# Patient Record
Sex: MALE | Race: WHITE | Employment: OTHER | URBAN - METROPOLITAN AREA
[De-identification: names, ages, dates, MRNs, and addresses within clinical notes are randomized per-mention and may not be internally consistent; named-entity substitution may affect disease eponyms.]

---

## 2017-03-27 ENCOUNTER — OFFICE VISIT (OUTPATIENT)
Dept: INTERNAL MEDICINE CLINIC | Age: 72
End: 2017-03-27

## 2017-03-27 VITALS
BODY MASS INDEX: 28.89 KG/M2 | TEMPERATURE: 98.7 F | WEIGHT: 218 LBS | HEART RATE: 80 BPM | SYSTOLIC BLOOD PRESSURE: 132 MMHG | DIASTOLIC BLOOD PRESSURE: 83 MMHG | RESPIRATION RATE: 12 BRPM | HEIGHT: 73 IN

## 2017-03-27 DIAGNOSIS — Z13.39 SCREENING FOR ALCOHOLISM: ICD-10-CM

## 2017-03-27 DIAGNOSIS — I10 ESSENTIAL HYPERTENSION: ICD-10-CM

## 2017-03-27 DIAGNOSIS — Z00.00 ROUTINE GENERAL MEDICAL EXAMINATION AT A HEALTH CARE FACILITY: ICD-10-CM

## 2017-03-27 DIAGNOSIS — F41.9 ANXIETY: ICD-10-CM

## 2017-03-27 DIAGNOSIS — D12.6 TUBULOVILLOUS ADENOMA OF COLON: ICD-10-CM

## 2017-03-27 DIAGNOSIS — Z00.00 MEDICARE ANNUAL WELLNESS VISIT, SUBSEQUENT: Primary | ICD-10-CM

## 2017-03-27 DIAGNOSIS — M62.830 BACK MUSCLE SPASM: ICD-10-CM

## 2017-03-27 DIAGNOSIS — E78.5 HYPERLIPIDEMIA WITH TARGET LDL LESS THAN 130: ICD-10-CM

## 2017-03-27 DIAGNOSIS — Z13.31 SCREENING FOR DEPRESSION: ICD-10-CM

## 2017-03-27 DIAGNOSIS — Z71.89 ADVANCED CARE PLANNING/COUNSELING DISCUSSION: ICD-10-CM

## 2017-03-27 RX ORDER — ALPRAZOLAM 0.25 MG/1
0.25 TABLET ORAL
Qty: 30 TAB | Refills: 0 | Status: SHIPPED | OUTPATIENT
Start: 2017-03-27 | End: 2018-04-18 | Stop reason: SDUPTHER

## 2017-03-27 RX ORDER — CYCLOBENZAPRINE HCL 10 MG
10 TABLET ORAL
Qty: 30 TAB | Refills: 1 | Status: SHIPPED | OUTPATIENT
Start: 2017-03-27 | End: 2019-06-05 | Stop reason: SDUPTHER

## 2017-03-27 RX ORDER — SIMVASTATIN 20 MG/1
TABLET, FILM COATED ORAL
Qty: 90 TAB | Refills: 1 | Status: SHIPPED | OUTPATIENT
Start: 2017-03-27 | End: 2018-07-06 | Stop reason: SDUPTHER

## 2017-03-27 RX ORDER — LISINOPRIL 20 MG/1
TABLET ORAL
Qty: 90 TAB | Refills: 1 | Status: SHIPPED | OUTPATIENT
Start: 2017-03-27 | End: 2018-07-06 | Stop reason: SDUPTHER

## 2017-03-27 NOTE — PROGRESS NOTES
HISTORY OF PRESENT ILLNESS    Chief Complaint   Patient presents with    Annual Wellness Visit       Presents for follow-up    Hypertension  Hypertension ROS: taking medications as instructed, no medication side effects noted, no TIA's, no chest pain on exertion, no dyspnea on exertion, no swelling of ankles     reports that he quit smoking about 42 years ago. His smoking use included Cigarettes. He has a 1.00 pack-year smoking history. He has never used smokeless tobacco.    reports that he drinks about 2.5 oz of alcohol per week    BP Readings from Last 2 Encounters:   03/27/17 132/83   03/24/16 120/76       Hyperlipidemia  ROS: taking medications as instructed, no medication side effects noted  No new myalgias, no joint pains, no weakness  No TIA's, no chest pain on exertion, no dyspnea on exertion, no swelling of ankles. 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 has a past surgical history that includes colonoscopy (7/22/09); cataract removal; tonsillectomy; and colonoscopy (10/31/2012). reports that he quit smoking about 42 years ago. His smoking use included Cigarettes. He has a 1.00 pack-year smoking history. He has never used smokeless tobacco. He reports that he drinks about 2.5 oz of alcohol per week  He reports that he does not use illicit drugs.   family history includes Cancer in an other family member; Emphysema in his father; Stroke in his mother. Physical Exam   Nursing note and vitals reviewed. Blood pressure 132/83, pulse 80, temperature 98.7 °F (37.1 °C), temperature source Oral, resp. rate 12, height 6' 1\" (1.854 m), weight 218 lb (98.9 kg). Constitutional:  No distress. Eyes: Conjunctivae are normal.   Ears:  Hearing grossly intact  Cardiovascular: Normal rate. regular rhythm, no murmurs or gallops  No edema  Pulmonary/Chest: Effort normal.   CTAB  Musculoskeletal: moves all 4 extremities   Neurological: Alert and oriented to person, place, and time. ZACHARIAH - prostate mildly enlarged, no nodules. Skin: No rash noted. Psychiatric: Normal mood and affect. Behavior is normal.     ASSESSMENT and PLAN  Ying Kang was seen today for annual wellness visit. Diagnoses and all orders for this visit:  L    Tubulovillous adenoma of colon- due 10/2017  -     REFERRAL FOR COLONOSCOPY    Hyperlipidemia with target LDL less than 130- Controlled on current regimen. Continue current medications as written in chart  -     LIPID PANEL    Essential hypertension- Controlled on current regimen. Continue current medications as written in chart.  -     CBC W/O DIFF  -     METABOLIC PANEL, COMPREHENSIVE    lab results and schedule of future lab studies reviewed with patient  reviewed medications and side effects in detail    Return to clinic for further evaluation if new symptoms develop    Follow-up Disposition: Not on File    Current Outpatient Prescriptions   Medication Sig    lisinopril (PRINIVIL, ZESTRIL) 20 mg tablet TAKE 1 TABLET BY MOUTH EVERY DAY    simvastatin (ZOCOR) 20 mg tablet TAKE 1 TABLET BY MOUTH EVERY EVENING    cyclobenzaprine (FLEXERIL) 10 mg tablet Take 1 Tab by mouth three (3) times daily as needed for Muscle Spasm(s).  ALPRAZolam (XANAX) 0.25 mg tablet Take 1 Tab by mouth two (2) times daily as needed for Anxiety.     loteprednol etabonate (LOTEMAX) 0.5 % ophthalmic suspension Administer 1 Drop to left eye. Once daily    dorzolamide (TRUSOPT) 2 % ophthalmic solution Administer 1 Drop to right eye. Twice daily     No current facility-administered medications for this visit. Physician Addendum  I personally saw and examined the patient. I have discussed and reviewed note with Nurse Navigator and agree with the Nurse Navigator's findings and recommendations for this Wellness Exam.  I was present during the key portions of separately billed procedures. Orders written and signed by me as per chart.     Prakash Hidalgo MD

## 2017-03-27 NOTE — PROGRESS NOTES
Nurse Navigator Medicare Wellness Visit performed by BRENNAN Zimmerman RN    This is a Subsequent Adriana Visit providing Personalized Prevention Plan Services (PPPS) (Performed 12 months after initial AWV and PPPS )    I have reviewed the patient's medical history in detail and updated the computerized patient record. History     Past Medical History:   Diagnosis Date    Anxiety 1/9/2014    Back muscle spasm 1/9/2014    Congenital cataract     surgery as child    Glaucoma (increased eye pressure)     Dr. Aura Gaitan and Srinivasan Holland HTN (hypertension)     Hyperlipidemia LDL goal < 130 1/6/2012    Tubulovillous adenoma of colon 7/2009    TV adenoma, repeat 10/2012 done, 10/2017      Past Surgical History:   Procedure Laterality Date    COLONOSCOPY  7/22/09    1.1 cm tubulovillous adenoma, tublar adenoma. Dr. Rico Dumont      as child    HX COLONOSCOPY  10/31/2012    polyp - tubular adenoma    HX TONSILLECTOMY       Current Outpatient Prescriptions   Medication Sig Dispense Refill    lisinopril (PRINIVIL, ZESTRIL) 20 mg tablet TAKE 1 TABLET BY MOUTH EVERY DAY 90 Tab 1    simvastatin (ZOCOR) 20 mg tablet TAKE 1 TABLET BY MOUTH EVERY EVENING 90 Tab 1    cyclobenzaprine (FLEXERIL) 10 mg tablet Take 1 Tab by mouth three (3) times daily as needed for Muscle Spasm(s). 30 Tab 1    ALPRAZolam (XANAX) 0.25 mg tablet Take 1 Tab by mouth two (2) times daily as needed for Anxiety. 30 Tab 0    loteprednol etabonate (LOTEMAX) 0.5 % ophthalmic suspension Administer 1 Drop to left eye. Once daily      dorzolamide (TRUSOPT) 2 % ophthalmic solution Administer 1 Drop to right eye.  Twice daily       Allergies   Allergen Reactions    Mercury (Bulk) Rash    Pcn [Penicillins] Rash     As a child; can take Cephalosporins without reaction     Family History   Problem Relation Age of Onset    Cancer Other      aunt    Stroke Mother      carotid dz    Emphysema Father      Social History Substance Use Topics    Smoking status: Former Smoker     Packs/day: 0.50     Years: 2.00     Types: Cigarettes     Quit date: 1/1/1975    Smokeless tobacco: Never Used    Alcohol use 2.5 oz/week     5 Standard drinks or equivalent per week     Patient Active Problem List   Diagnosis Code    Glaucoma (increased eye pressure) H40.9    HTN (hypertension) I10    Tubulovillous adenoma of colon D12.6    Hyperlipidemia with target LDL less than 130 E78.5    Back muscle spasm M62.830    Anxiety F41.9    Advanced care planning/counseling discussion Z71.89       Depression Risk Factor Screening:   Patient denies feelings of being down, depressed or hopeless at this time. Patient states that they have a strong support system within their family & friends. PHQ 2 / 9, over the last two weeks 3/27/2017   Little interest or pleasure in doing things Not at all   Feeling down, depressed or hopeless Not at all   Total Score PHQ 2 0     Alcohol Risk Factor Screening: On any occasion during the past 3 months, have you had more than 4 drinks containing alcohol? No    Do you average more than 14 drinks per week? No      Functional Ability and Level of Safety:     Hearing Loss   normal-to-mild    Activities of Daily Living   Self-care. Patient states that he lives with his wife in a private residence. Patient states independence in all ADLs & denies the use of assistive devices for ambulation. NN encouraged patient to continue and/ or introduce routine physical exercise into their daily routine as applicable & as recommended by PCP. Patient verbalized understanding & agreement to take this into consideration.     Requires assistance with:   ADL Assessment 3/27/2017   Feeding yourself No Help Needed   Getting from bed to chair No Help Needed   Getting dressed No Help Needed   Bathing or showering No Help Needed   Walk across the room (includes cane/walker) No Help Needed   Using the telphone No Help Needed   Taking your medications No Help Needed   Preparing meals No Help Needed   Managing money (expenses/bills) No Help Needed   Moderately strenuous housework (laundry) No Help Needed   Shopping for personal items (toiletries/medicines) No Help Needed   Shopping for groceries No Help Needed   Driving No Help Needed   Climbing a flight of stairs No Help Needed   Getting to places beyond walking distances No Help Needed       Fall Risk   Patient denies falls within the past year & verbalizes awareness of fall prevention strategies. Fall Risk Assessment, last 12 mths 3/27/2017   Able to walk? Yes   Fall in past 12 months? No     Abuse Screen   Patient is not abused    Review of Systems   Medicare Wellness Visit    Physical Examination     Evaluation of Cognitive Function:  Mood/affect:  happy  Appearance: age appropriate and casually dressed  Family member/caregiver input: None present; however, patient reports a strong support system. No exam performed today, Medicare Wellness Visit. Patient Care Team:  Anjum Cruz MD as PCP - General (Internal Medicine)    Advice/Referrals/Counseling   Education and counseling provided:  End-of-Life planning (with patient's consent)  Pneumococcal Vaccine  Influenza Vaccine  Prostate cancer screening tests (PSA, covered annually)  Colorectal cancer screening tests  Screening for glaucoma  tdap & shingles vaccinations      Assessment/Plan   1. Patient states that a completed Advanced Medical Directive is at home. NN encouraged patient to bring a copy of the completed Advanced Medical Directive to the office for scanning into the medical record. Patient verbalized understanding & agreement. 2. Patient is up to date on the following immunizations: flu vaccine (admin 12/2016), tdap vaccine (admin 1/2010), shingles vaccine (admin 3/2015), pneumonia 23 vaccine (admin 12/2011) & prevnar 13 vaccine (admin 2/9/2017). Patient confirmed the aforementioned preventative immunization dates are correct. Patient's health maintenance immunization record has been updated & is current. 3. Patient states that he follows his PCP's recommendations for PSA screenings (report on file from 3/2015) & Colonoscopy screenings (report on file from 10/31/2012 performed by Dr. Kayla Guzman with recommended screening follow-up in 5 years). Patient verbalized awareness that he is due for a screening colonoscopy this winter, 2017. Today, PCP provided patient with a referral to Dr. Waqar Mazariegos for evaluation of a follow-up screening colonoscopy due 10/2015. Hep C screening completed by patient 3/30/2016. 4.Patient wears corrective lenses. Patient reports having a routine eye exam & glaucoma screening within the last year performed by Dr. Janki Mckinney. [facility] faxed requesting a copy of patient's last eye exam with glaucoma screening with patient's verbal approval.     Patient verbalized understanding of all information discussed. Patient was given the opportunity to ask questions. Medication reconciliation completed by MA/ LPN and reviewed by PCP. Patient provided AVS which includes Medicare Wellness Preventative Screening Table. Physician Addendum  I personally saw and examined the patient. I have discussed and reviewed note with Nurse Navigator and agree with the Nurse Navigator's findings and recommendations for this Wellness Exam.  I was present during the key portions of separately billed procedures. Orders written and signed by me as per chart.     Powell Libman, MD

## 2017-03-27 NOTE — MR AVS SNAPSHOT
Visit Information Date & Time Provider Department Dept. Phone Encounter #  
 3/27/2017 10:00 AM Bart Ngo MD [facility] [address] [id number] Upcoming Health Maintenance Date Due  
 GLAUCOMA SCREENING Q2Y 3/19/2017 DTaP/Tdap/Td series (1 - Tdap) 1/1/2020* COLONOSCOPY 10/31/2017 Pneumococcal 65+ Low/Medium Risk (2 of 2 - PPSV23) 2/9/2018 MEDICARE YEARLY EXAM 3/28/2018 *Topic was postponed. The date shown is not the original due date. Allergies as of 3/27/2017  Review Complete On: 3/27/2017 By: Bart Ngo MD  
  
 Severity Noted Reaction Type Reactions Mercury (Bulk)  11/04/2011    Rash Pcn [Penicillins]  11/04/2011    Rash As a child; can take Cephalosporins without reaction Current Immunizations  Reviewed on 3/24/2016 Name Date Hepatitis B Vaccine 1/1/1986 Influenza High Dose Vaccine PF 12/16/2016 Influenza Vaccine 10/1/2015, 11/1/2013 Influenza Vaccine Whole 10/4/2011 Pneumococcal Conjugate (PCV-13) 2/9/2017 Pneumococcal Vaccine (Unspecified Type) 12/16/2011 TD Vaccine 1/1/2010 Zoster Vaccine, Live 3/19/2015 Not reviewed this visit You Were Diagnosed With   
  
 Codes Comments Medicare annual wellness visit, subsequent    -  Primary ICD-10-CM: Z00.00 ICD-9-CM: V70.0 Tubulovillous adenoma of colon     ICD-10-CM: D12.6 ICD-9-CM: 211.3 Hyperlipidemia with target LDL less than 130     ICD-10-CM: E78.5 ICD-9-CM: 272.4 Essential hypertension     ICD-10-CM: I10 
ICD-9-CM: 401.9 Vitals BP Pulse Temp Resp Height(growth percentile) Weight(growth percentile) 132/83 (BP 1 Location: Left arm, BP Patient Position: Sitting) 80 98.7 °F (37.1 °C) (Oral) 12 6' 1\" (1.854 m) 218 lb (98.9 kg) BMI Smoking Status 28.76 kg/m2 Former Smoker Vitals History BMI and BSA Data Body Mass Index Body Surface Area  28.76 kg/m 2 2.26 m 2  
  
  
 Preferred Pharmacy Pharmacy Name Phone [facility] [phone] Your Updated Medication List  
  
   
This list is accurate as of: 3/27/17 10:53 AM.  Always use your most recent med list.  
  
  
  
  
 ALPRAZolam 0.25 mg tablet Commonly known as:  Alicia Dariana Take 1 Tab by mouth two (2) times daily as needed for Anxiety. cyclobenzaprine 10 mg tablet Commonly known as:  FLEXERIL Take 1 Tab by mouth three (3) times daily as needed for Muscle Spasm(s). lisinopril 20 mg tablet Commonly known as:  PRINIVIL, ZESTRIL  
TAKE 1 TABLET BY MOUTH EVERY DAY  
  
 loteprednol etabonate 0.5 % ophthalmic suspension Commonly known as:  Antoniette Locket Administer 1 Drop to left eye. Once daily  
  
 simvastatin 20 mg tablet Commonly known as:  ZOCOR  
TAKE 1 TABLET BY MOUTH EVERY EVENING  
  
 TRUSOPT 2 % ophthalmic solution Generic drug:  dorzolamide Administer 1 Drop to right eye. Twice daily We Performed the Following CBC W/O DIFF [91739 CPT(R)] LIPID PANEL [13002 CPT(R)] METABOLIC PANEL, COMPREHENSIVE [27877 CPT(R)] REFERRAL FOR COLONOSCOPY [[id number] Custom] Comments:  
 Please evaluate patient for follow-up colonscopy. Referral Information Referral ID Referred By Referred To  
  
 [id number] [place] [address]  [place], [address] [place] [address] Visits Status Start Date End Date 1 New Request 3/27/17 3/27/18 If your referral has a status of pending review or denied, additional information will be sent to support the outcome of this decision. Introducing [facility]! Dear Jennifer Handler: 
Thank you for requesting a [employer] account. Our records indicate that you already have an active [employer] account. You can access your account anytime at https://[email or web address] CUPS and some sugar. "[employer]/5 CUPS and some sugar Did you know that you can access your hospital and ER discharge instructions at any time in [employer]? You can also review all of your test results from your hospital stay or ER visit. Additional Information If you have questions, please visit the Frequently Asked Questions section of the [employer] website at [email or web address] Remember, [employer] is NOT to be used for urgent needs. For medical emergencies, dial 911. Now available from your iPhone and Android! Please provide this summary of care documentation to your next provider. Your primary care clinician is listed as Primitivo Burdick. If you have any questions after today's visit, please call [phone].

## 2017-04-13 ENCOUNTER — HOSPITAL ENCOUNTER (OUTPATIENT)
Dept: LAB | Age: 72
Discharge: HOME | End: 2017-04-13
Payer: MEDICARE

## 2017-04-13 PROCEDURE — 80061 LIPID PANEL: CPT

## 2017-04-13 PROCEDURE — 85027 COMPLETE CBC AUTOMATED: CPT

## 2017-04-13 PROCEDURE — 36415 COLL VENOUS BLD VENIPUNCTURE: CPT

## 2017-04-13 PROCEDURE — 80053 COMPREHEN METABOLIC PANEL: CPT

## 2017-04-14 LAB
ALBUMIN SERPL-MCNC: 4.3 G/DL (ref 3.5–4.8)
ALBUMIN/GLOB SERPL: 1.8 {RATIO} (ref 1.2–2.2)
ALP SERPL-CCNC: 33 IU/L (ref 39–117)
ALT SERPL-CCNC: 16 IU/L (ref 0–44)
AST SERPL-CCNC: 19 IU/L (ref 0–40)
BILIRUB SERPL-MCNC: 0.4 MG/DL (ref 0–1.2)
BUN SERPL-MCNC: 15 MG/DL (ref 8–27)
BUN/CREAT SERPL: 14 (ref 10–24)
CALCIUM SERPL-MCNC: 9.8 MG/DL (ref 8.6–10.2)
CHLORIDE SERPL-SCNC: 98 MMOL/L (ref 96–106)
CHOLEST SERPL-MCNC: 214 MG/DL (ref 100–199)
CO2 SERPL-SCNC: 29 MMOL/L (ref 18–29)
CREAT SERPL-MCNC: 1.1 MG/DL (ref 0.76–1.27)
ERYTHROCYTE [DISTWIDTH] IN BLOOD BY AUTOMATED COUNT: 14.2 % (ref 12.3–15.4)
GLOBULIN SER CALC-MCNC: 2.4 G/DL (ref 1.5–4.5)
GLUCOSE SERPL-MCNC: 88 MG/DL (ref 65–99)
HCT VFR BLD AUTO: 41.5 % (ref 37.5–51)
HDLC SERPL-MCNC: 50 MG/DL
HGB BLD-MCNC: 13.7 G/DL (ref 12.6–17.7)
INTERPRETATION, 910389: NORMAL
LDLC SERPL CALC-MCNC: 139 MG/DL (ref 0–99)
MCH RBC QN AUTO: 31.5 PG (ref 26.6–33)
MCHC RBC AUTO-ENTMCNC: 33 G/DL (ref 31.5–35.7)
MCV RBC AUTO: 95 FL (ref 79–97)
PLATELET # BLD AUTO: 252 X10E3/UL (ref 150–379)
POTASSIUM SERPL-SCNC: 4.7 MMOL/L (ref 3.5–5.2)
PROT SERPL-MCNC: 6.7 G/DL (ref 6–8.5)
RBC # BLD AUTO: 4.35 X10E6/UL (ref 4.14–5.8)
SODIUM SERPL-SCNC: 140 MMOL/L (ref 134–144)
TRIGL SERPL-MCNC: 124 MG/DL (ref 0–149)
VLDLC SERPL CALC-MCNC: 25 MG/DL (ref 5–40)
WBC # BLD AUTO: 6.2 X10E3/UL (ref 3.4–10.8)

## 2018-04-18 ENCOUNTER — OFFICE VISIT (OUTPATIENT)
Dept: INTERNAL MEDICINE CLINIC | Age: 73
End: 2018-04-18

## 2018-04-18 VITALS
TEMPERATURE: 98.4 F | DIASTOLIC BLOOD PRESSURE: 80 MMHG | BODY MASS INDEX: 28.41 KG/M2 | OXYGEN SATURATION: 98 % | SYSTOLIC BLOOD PRESSURE: 122 MMHG | WEIGHT: 214.4 LBS | HEART RATE: 69 BPM | HEIGHT: 73 IN | RESPIRATION RATE: 16 BRPM

## 2018-04-18 DIAGNOSIS — Z00.00 MEDICARE ANNUAL WELLNESS VISIT, SUBSEQUENT: Primary | ICD-10-CM

## 2018-04-18 DIAGNOSIS — Z71.89 ADVANCED CARE PLANNING/COUNSELING DISCUSSION: ICD-10-CM

## 2018-04-18 DIAGNOSIS — F41.9 ANXIETY: ICD-10-CM

## 2018-04-18 DIAGNOSIS — D12.6 TUBULOVILLOUS ADENOMA OF COLON: ICD-10-CM

## 2018-04-18 DIAGNOSIS — I10 ESSENTIAL HYPERTENSION: ICD-10-CM

## 2018-04-18 DIAGNOSIS — E78.5 HYPERLIPIDEMIA WITH TARGET LDL LESS THAN 130: ICD-10-CM

## 2018-04-18 RX ORDER — ALPRAZOLAM 0.25 MG/1
0.25 TABLET ORAL
Qty: 30 TAB | Refills: 0 | Status: SHIPPED | OUTPATIENT
Start: 2018-04-18 | End: 2019-06-05 | Stop reason: ALTCHOICE

## 2018-04-18 RX ORDER — ZOSTER VACCINE RECOMBINANT, ADJUVANTED 50 MCG/0.5
0.5 KIT INTRAMUSCULAR ONCE
Qty: 0.5 ML | Refills: 0 | Status: SHIPPED | OUTPATIENT
Start: 2018-04-18 | End: 2018-04-18

## 2018-04-18 NOTE — PROGRESS NOTES
Lorena Tripp is a 71 y.o. male    Chief Complaint   Patient presents with   [facility] Wellness Visit     medicare        1. Have you been to the ER, urgent care clinic since your last visit? Hospitalized since your last visit? No    2. Have you seen or consulted any other health care providers outside of the [address] [place] [address] since your last visit? Include any pap smears or colon screening.   No    Visit Vitals    /88 (BP 1 Location: Left arm, BP Patient Position: Sitting)    Pulse 69    Temp 98.4 °F (36.9 °C) (Oral)    Resp 16    Ht 6' 1\" (1.854 m)    Wt 214 lb 6.4 oz (97.3 kg)    SpO2 98%    BMI 28.29 kg/m2

## 2018-04-18 NOTE — ACP (ADVANCE CARE PLANNING)
Advance Care Planning (ACP) Provider Note - Comprehensive     Date of ACP Conversation: 04/18/18  Persons included in Conversation:  patient  Length of ACP Conversation in minutes:  <16 minutes (Non-Billable)    Authorized Decision Maker (if patient is incapable of making informed decisions): This person is:  Healthcare Agent/Medical Power of  under Advance Directive          General ACP for ALL Patients with Decision Making Capacity:   Importance of advance care planning, including choosing a healthcare agent to communicate patient's healthcare decisions if patient lost the ability to make decisions, such as after a sudden illness or accident  Understanding of the healthcare agent role was assessed and information provided  Exploration of values, goals, and preferences if recovery is not expected, even with continued medical treatment in the event of: Imminent death  Severe, permanent brain injury    Review of Existing Advance Directive:  not availble     For Serious or Chronic Illness:  Understanding of medical condition    Understanding of CPR, goals and expected outcomes, benefits and burdens discussed. Information on CPR success rates provided (e.g. for CPR in hospital, survival to d/c at two weeks is 22%, for chronically ill or elderly/frail survival is less than 3%); Individual asked to communicate understanding of information in his/her own words.   Explored fears and concerns regarding CPR or possible outcomes    Interventions Provided:  Recommended completion of Advance Directive form after review of ACP materials and conversation with prospective healthcare agent   Recommended communicating the plan and making copies for the healthcare agent, personal physician, and others as appropriate (e.g., health system)

## 2018-04-18 NOTE — MR AVS SNAPSHOT
Laura Goodson 
 
 
 [address] [facility] [address] [facility] 
[phone] Patient: Brock Pichardo MRN: [id number] :1945 Visit Information Date & Time Provider Department Dept. Phone Encounter #  
 2018  2:45 PM Sienna Hester MD [facility] [address] [id number] Upcoming Health Maintenance Date Due  
 GLAUCOMA SCREENING Q2Y 3/19/2017 COLONOSCOPY 2018* DTaP/Tdap/Td series (1 - Tdap) 2020* Pneumococcal 65+ Low/Medium Risk (2 of 2 - PPSV23) 2019 MEDICARE YEARLY EXAM 2019 *Topic was postponed. The date shown is not the original due date. Allergies as of 2018  Review Complete On: 2018 By: Ethelle Scale Severity Noted Reaction Type Reactions Mercury (Bulk)  2011    Rash Pcn [Penicillins]  2011    Rash As a child; can take Cephalosporins without reaction Current Immunizations  Reviewed on 2018 Name Date Hepatitis B Vaccine 1986 Influenza High Dose Vaccine PF 2016 Influenza Vaccine 2017, 10/1/2015, 2013 Influenza Vaccine Whole 10/4/2011 Pneumococcal Conjugate (PCV-13) 2018 Pneumococcal Polysaccharide (PPSV-23) 2011 TD Vaccine 2010 ZZZ-RETIRED (DO NOT USE) Pneumococcal Vaccine (Unspecified Type) 2011 Zoster Vaccine, Live 3/19/2015 Reviewed by Sienna Hester MD on 2018 at  3:13 PM  
You Were Diagnosed With   
  
 Codes Comments Medicare annual wellness visit, subsequent    -  Primary ICD-10-CM: Z00.00 ICD-9-CM: V70.0 Advanced care planning/counseling discussion     ICD-10-CM: Z71.89 ICD-9-CM: V65.49 Tubulovillous adenoma of colon     ICD-10-CM: D12.6 ICD-9-CM: 211.3 Hyperlipidemia with target LDL less than 130     ICD-10-CM: E78.5 ICD-9-CM: 272.4 Essential hypertension     ICD-10-CM: I10 
ICD-9-CM: 401.9 Vitals BP Pulse Temp Resp Height(growth percentile) Weight(growth percentile) 122/80 (BP 1 Location: Left arm, BP Patient Position: Sitting) 69 98.4 °F (36.9 °C) (Oral) 16 6' 1\" (1.854 m) 214 lb 6.4 oz (97.3 kg) SpO2 BMI Smoking Status 98% 28.29 kg/m2 Former Smoker Vitals History BMI and BSA Data Body Mass Index Body Surface Area  
 28.29 kg/m 2 2.24 m 2 Preferred Pharmacy Pharmacy Name Phone [address], [address] [place] [phone] Your Updated Medication List  
  
   
This list is accurate as of 18  3:27 PM.  Always use your most recent med list.  
  
  
  
  
 ALPRAZolam 0.25 mg tablet Commonly known as:  Bhakti Stai Take 1 Tab by mouth two (2) times daily as needed for Anxiety. cyclobenzaprine 10 mg tablet Commonly known as:  FLEXERIL Take 1 Tab by mouth three (3) times daily as needed for Muscle Spasm(s). lisinopril 20 mg tablet Commonly known as:  PRINIVIL, ZESTRIL  
TAKE 1 TABLET BY MOUTH EVERY DAY  
  
 loteprednol etabonate 0.5 % ophthalmic suspension Commonly known as:  Joyce Sickle Administer 1 Drop to left eye. Once daily SHINGRIX (PF) 50 mcg/0.5 mL Susr injection Generic drug:  varicella-zoster recombinant (PF)  
0.5 mL by IntraMUSCular route once for 1 dose. Receive 2nd dose in 2-6 months. For Shingles (Zoster) prevention  
  
 simvastatin 20 mg tablet Commonly known as:  ZOCOR  
TAKE 1 TABLET BY MOUTH EVERY EVENING  
  
 TRUSOPT 2 % ophthalmic solution Generic drug:  dorzolamide Administer 1 Drop to right eye. Twice daily TURMERIC (BULK)  
by Does Not Apply route. Prescriptions Printed Refills SHINGRIX, PF, 50 mcg/0.5 mL susr injection 0 Si.5 mL by IntraMUSCular route once for 1 dose. Receive 2nd dose in 2-6 months. For Shingles (Zoster) prevention Class: Print Route: IntraMUSCular We Performed the Following CBC W/O DIFF [33380 CPT(R)] LIPID PANEL [94949 CPT(R)] METABOLIC PANEL, COMPREHENSIVE [55662 CPT(R)] PSA W/ REFLX FREE PSA [50921 CPT(R)] REFERRAL TO GASTROENTEROLOGY [HIY77 Custom] Referral Information Referral ID Referred By Referred To  
  
 [id number] [facility] [address] [place] [address] [place], [address] [facility] Visits Status Start Date End Date 1 New Request 4/18/18 4/18/19 If your referral has a status of pending review or denied, additional information will be sent to support the outcome of this decision. Introducing [facility]! Dear Charmaine Daniel: 
Thank you for requesting a [employer] account. Our records indicate that you already have an active [employer] account. You can access your account anytime at [email or web address] Did you know that you can access your hospital and ER discharge instructions at any time in [employer]? You can also review all of your test results from your hospital stay or ER visit. Additional Information If you have questions, please visit the Frequently Asked Questions section of the [employer] website at [email or web address] Remember, [employer] is NOT to be used for urgent needs. For medical emergencies, dial 911. Now available from your iPhone and Android! Please provide this summary of care documentation to your next provider. Your primary care clinician is listed as Jim Hall. If you have any questions after today's visit, please call [phone].

## 2018-04-24 ENCOUNTER — HOSPITAL ENCOUNTER (OUTPATIENT)
Dept: LAB | Age: 73
Discharge: HOME | End: 2018-04-24
Payer: MEDICARE

## 2018-04-24 ENCOUNTER — OFFICE VISIT (OUTPATIENT)
Dept: INTERNAL MEDICINE CLINIC | Age: 73
End: 2018-04-24

## 2018-04-24 VITALS
SYSTOLIC BLOOD PRESSURE: 140 MMHG | DIASTOLIC BLOOD PRESSURE: 86 MMHG | TEMPERATURE: 97.8 F | RESPIRATION RATE: 16 BRPM | OXYGEN SATURATION: 97 % | WEIGHT: 212.6 LBS | HEART RATE: 63 BPM | HEIGHT: 73 IN | BODY MASS INDEX: 28.18 KG/M2

## 2018-04-24 DIAGNOSIS — M79.641 RIGHT HAND PAIN: ICD-10-CM

## 2018-04-24 DIAGNOSIS — R19.09 MASS OF LEFT INGUINAL REGION: Primary | ICD-10-CM

## 2018-04-24 DIAGNOSIS — R03.0 BLOOD PRESSURE ELEVATED WITHOUT HISTORY OF HTN: ICD-10-CM

## 2018-04-24 PROCEDURE — 80053 COMPREHEN METABOLIC PANEL: CPT

## 2018-04-24 PROCEDURE — 84153 ASSAY OF PSA TOTAL: CPT

## 2018-04-24 PROCEDURE — 85027 COMPLETE CBC AUTOMATED: CPT

## 2018-04-24 PROCEDURE — 80061 LIPID PANEL: CPT

## 2018-04-24 NOTE — PROGRESS NOTES
Jemma Duarte is a 71 y.o. male    Chief Complaint   Patient presents with    Mass     on abdomen pt noticed this morning with no symptoms other than tat its annoying the pt with its presence        1. Have you been to the ER, urgent care clinic since your last visit? Hospitalized since your last visit? no    2. Have you seen or consulted any other health care providers outside of the [address] [place] [address] since your last visit? Include any pap smears or colon screening.   no    Visit Vitals    /89 (BP 1 Location: Left arm, BP Patient Position: Sitting)    Pulse 63    Temp 97.8 °F (36.6 °C) (Oral)    Resp 16    Ht 6' 1\" (1.854 m)    Wt 212 lb 9.6 oz (96.4 kg)    SpO2 97%    BMI 28.05 kg/m2

## 2018-04-24 NOTE — PROGRESS NOTES
Chief Complaint   Patient presents with    Mass     on abdomen pt noticed this morning with no symptoms other than tat its annoying the pt with its presence      Inguinal mass  Pt reports he noticed a bulge on his right lower abdomen this am. He denies pain, some mild tenderness to palpation. No abdominal pain, no blood in urine. Right hand pain  Pt reports he fell on his right hand and his wife is concerned he injured or fractured it. He reports good ROM and able to do most baseline activities. Past Medical History:   Diagnosis Date    Anxiety 1/9/2014    Back muscle spasm 1/9/2014    Congenital cataract     surgery as child    Glaucoma (increased eye pressure)     Dr. 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 Dr. Felipe Sanz      as child    HX COLONOSCOPY  10/31/2012    polyp - tubular adenoma    HX TONSILLECTOMY       Social History     Social History    Marital status:      Spouse name:  Bertha Rivera Number of children: 2    Years of education: N/A     Occupational History    Retired 2010 [employer]       Social History Main Topics    Smoking status: Former Smoker     Packs/day: 0.50     Years: 2.00     Types: Cigarettes     Quit date: 1/1/1975    Smokeless tobacco: Never Used    Alcohol use 2.5 oz/week     5 Standard drinks or equivalent per week    Drug use: No    Sexual activity: No     Other Topics Concern    None     Social History Narrative    Cyclist.      Son age 28, daughter age 34     Family History   Problem Relation Age of Onset    Cancer Other      aunt    Stroke Mother      carotid dz    Emphysema Father      Current Outpatient Prescriptions   Medication Sig Dispense Refill    TURMERIC, BULK, by Does Not Apply route.  ALPRAZolam (XANAX) 0.25 mg tablet Take 1 Tab by mouth two (2) times daily as needed for Anxiety. 30 Tab 0    lisinopril (PRINIVIL, ZESTRIL) 20 mg tablet TAKE 1 TABLET BY MOUTH EVERY DAY 90 Tab 1    simvastatin (ZOCOR) 20 mg tablet TAKE 1 TABLET BY MOUTH EVERY EVENING 90 Tab 1    cyclobenzaprine (FLEXERIL) 10 mg tablet Take 1 Tab by mouth three (3) times daily as needed for Muscle Spasm(s). 30 Tab 1    loteprednol etabonate (LOTEMAX) 0.5 % ophthalmic suspension Administer 1 Drop to left eye. Once daily      dorzolamide (TRUSOPT) 2 % ophthalmic solution Administer 1 Drop to right eye. Twice daily       Allergies   Allergen Reactions    Mercury (Bulk) Rash    Pcn [Penicillins] Rash     As a child; can take Cephalosporins without reaction       Review of Systems - General ROS: negative for - chills, fatigue, fever, hot flashes or malaise  Cardiovascular ROS: no chest pain or dyspnea on exertion  Respiratory ROS: no cough, shortness of breath, or wheezing    Visit Vitals    /86 (BP 1 Location: Left arm, BP Patient Position: Sitting)    Pulse 63    Temp 97.8 °F (36.6 °C) (Oral)    Resp 16    Ht 6' 1\" (1.854 m)    Wt 212 lb 9.6 oz (96.4 kg)    SpO2 97%    BMI 28.05 kg/m2     General Appearance:  Well developed, well nourished,alert and oriented x 3, and individual in no acute distress. Ears/Nose/Mouth/Throat:   Hearing grossly normal.         Neck: Supple, no lad, no bruits   Chest:   Lungs clear to auscultation bilaterally. Cardiovascular:  Regular rate and rhythm, S1, S2 normal, no murmur. Abdomen:   Soft, non-tender, bowel sounds are active. Right inguinal area: wnl  Left inguinal 3\" mobile mass, mild TTP,    Extremities: No edema bilaterally. Right hand 5/5 motor strength to hand  and finger thumb/fifth digit very well intact   Skin: Warm and dry, no suspicious lesions                 Diagnoses and all orders for this visit:    1.  Mass of left inguinal region  Likely benign process. It does not seem to be a hernia or lymph node, possible cyst  Will image  -     US ABD LTD; Future    2. Right hand pain  wnl   Motor no deficits    bp without htn  Noted initial bp 156/90  Pt noted concern over mass  Rechecked and coming down  Will monitor  This note will not be viewable in MyChart.

## 2018-04-24 NOTE — MR AVS SNAPSHOT
[address] [place] [address] 
 
 
 [address] 
[phone] Patient: Saul Mtz MRN: [id number] :1945 Visit Information Date & Time Provider Department Dept. Phone Encounter #  
 2018 10:40 AM Kunal Herman MD [facility] [address] [id number] Upcoming Health Maintenance Date Due  
 GLAUCOMA SCREENING Q2Y 3/19/2017 Pneumococcal 65+ Low/Medium Risk (2 of 2 - PPSV23) 2018 COLONOSCOPY 2018* DTaP/Tdap/Td series (1 - Tdap) 2020* MEDICARE YEARLY EXAM 2019 *Topic was postponed. The date shown is not the original due date. Allergies as of 2018  Review Complete On: 2018 By: Eris Kent Severity Noted Reaction Type Reactions Mercury (Bulk)  2011    Rash Pcn [Penicillins]  2011    Rash As a child; can take Cephalosporins without reaction Current Immunizations  Reviewed on 2018 Name Date Hepatitis B Vaccine 1986 Influenza High Dose Vaccine PF 2016 Influenza Vaccine 2017, 10/1/2015, 2013 Influenza Vaccine Whole 10/4/2011 Pneumococcal Conjugate (PCV-13) 2017 Pneumococcal Polysaccharide (PPSV-23) 2011 TD Vaccine 2010 ZZZ-RETIRED (DO NOT USE) Pneumococcal Vaccine (Unspecified Type) 2011 Zoster Vaccine, Live 3/19/2015 Not reviewed this visit You Were Diagnosed With   
  
 Codes Comments Mass of left inguinal region    -  Primary ICD-10-CM: R19.09 
ICD-9-CM: 789.39 Right hand pain     ICD-10-CM: M79.641 ICD-9-CM: 729.5 Vitals BP Pulse Temp Resp Height(growth percentile) Weight(growth percentile) 140/86 (BP 1 Location: Left arm, BP Patient Position: Sitting) 63 97.8 °F (36.6 °C) (Oral) 16 6' 1\" (1.854 m) 212 lb 9.6 oz (96.4 kg) SpO2 BMI Smoking Status 97% 28.05 kg/m2 Former Smoker Vitals History BMI and BSA Data Body Mass Index Body Surface Area 28.05 kg/m 2 2.23 m 2 Preferred Pharmacy Pharmacy Name Phone [facility] [phone] Your Updated Medication List  
  
   
This list is accurate as of 4/24/18 11:35 AM.  Always use your most recent med list.  
  
  
  
  
 ALPRAZolam 0.25 mg tablet Commonly known as:  [place] Take 1 Tab by mouth two (2) times daily as needed for Anxiety. cyclobenzaprine 10 mg tablet Commonly known as:  FLEXERIL Take 1 Tab by mouth three (3) times daily as needed for Muscle Spasm(s). lisinopril 20 mg tablet Commonly known as:  PRINIVIL, ZESTRIL  
TAKE 1 TABLET BY MOUTH EVERY DAY  
  
 loteprednol etabonate 0.5 % ophthalmic suspension Commonly known as:  Kulwinder Polo Administer 1 Drop to left eye. Once daily  
  
 simvastatin 20 mg tablet Commonly known as:  ZOCOR  
TAKE 1 TABLET BY MOUTH EVERY EVENING  
  
 TRUSOPT 2 % ophthalmic solution Generic drug:  dorzolamide Administer 1 Drop to right eye. Twice daily TURMERIC (BULK)  
by Does Not Apply route. To-Do List   
 04/24/2018 Imaging:  US ABD LTD [facility]! Dear Kylee Ferguson: 
Thank you for requesting a [employer] account. Our records indicate that you already have an active [employer] account. You can access your account anytime at [email or web address] Did you know that you can access your hospital and ER discharge instructions at any time in [employer]? You can also review all of your test results from your hospital stay or ER visit. Additional Information If you have questions, please visit the Frequently Asked Questions section of the [employer] website at [email or web address] Remember, [employer] is NOT to be used for urgent needs. For medical emergencies, dial 911. Now available from your iPhone and Android! Please provide this summary of care documentation to your next provider. Your primary care clinician is listed as Giovanny Armenta. If you have any questions after today's visit, please call [phone].

## 2018-04-25 ENCOUNTER — HOSPITAL ENCOUNTER (OUTPATIENT)
Dept: ULTRASOUND IMAGING | Age: 73
Discharge: HOME | End: 2018-04-25
Attending: INTERNAL MEDICINE
Payer: MEDICARE

## 2018-04-25 DIAGNOSIS — R19.09 MASS OF LEFT INGUINAL REGION: ICD-10-CM

## 2018-04-25 LAB
ALBUMIN SERPL-MCNC: 4.4 G/DL (ref 3.5–4.8)
ALBUMIN/GLOB SERPL: 1.6 {RATIO} (ref 1.2–2.2)
ALP SERPL-CCNC: 37 IU/L (ref 39–117)
ALT SERPL-CCNC: 19 IU/L (ref 0–44)
AST SERPL-CCNC: 20 IU/L (ref 0–40)
BILIRUB SERPL-MCNC: 0.6 MG/DL (ref 0–1.2)
BUN SERPL-MCNC: 19 MG/DL (ref 8–27)
BUN/CREAT SERPL: 19 (ref 10–24)
CALCIUM SERPL-MCNC: 9.8 MG/DL (ref 8.6–10.2)
CHLORIDE SERPL-SCNC: 99 MMOL/L (ref 96–106)
CHOLEST SERPL-MCNC: 179 MG/DL (ref 100–199)
CO2 SERPL-SCNC: 25 MMOL/L (ref 18–29)
CREAT SERPL-MCNC: 0.98 MG/DL (ref 0.76–1.27)
ERYTHROCYTE [DISTWIDTH] IN BLOOD BY AUTOMATED COUNT: 13.9 % (ref 12.3–15.4)
GFR SERPLBLD CREATININE-BSD FMLA CKD-EPI: 77 ML/MIN/1.73
GFR SERPLBLD CREATININE-BSD FMLA CKD-EPI: 89 ML/MIN/1.73
GLOBULIN SER CALC-MCNC: 2.8 G/DL (ref 1.5–4.5)
GLUCOSE SERPL-MCNC: 95 MG/DL (ref 65–99)
HCT VFR BLD AUTO: 41.3 % (ref 37.5–51)
HDLC SERPL-MCNC: 52 MG/DL
HGB BLD-MCNC: 13.7 G/DL (ref 13–17.7)
INTERPRETATION, 910389: NORMAL
LDLC SERPL CALC-MCNC: 112 MG/DL (ref 0–99)
MCH RBC QN AUTO: 31.6 PG (ref 26.6–33)
MCHC RBC AUTO-ENTMCNC: 33.2 G/DL (ref 31.5–35.7)
MCV RBC AUTO: 95 FL (ref 79–97)
PLATELET # BLD AUTO: 260 X10E3/UL (ref 150–379)
POTASSIUM SERPL-SCNC: 4.7 MMOL/L (ref 3.5–5.2)
PROT SERPL-MCNC: 7.2 G/DL (ref 6–8.5)
PSA SERPL-MCNC: 2.2 NG/ML (ref 0–4)
RBC # BLD AUTO: 4.33 X10E6/UL (ref 4.14–5.8)
REFLEX CRITERIA: NORMAL
SODIUM SERPL-SCNC: 141 MMOL/L (ref 134–144)
TRIGL SERPL-MCNC: 76 MG/DL (ref 0–149)
VLDLC SERPL CALC-MCNC: 15 MG/DL (ref 5–40)
WBC # BLD AUTO: 7.2 X10E3/UL (ref 3.4–10.8)

## 2018-04-25 PROCEDURE — 76882 US LMTD JT/FCL EVL NVASC XTR: CPT

## 2018-04-26 ENCOUNTER — TELEPHONE (OUTPATIENT)
Dept: INTERNAL MEDICINE CLINIC | Age: 73
End: 2018-04-26

## 2018-04-26 DIAGNOSIS — R59.0 LAD (LYMPHADENOPATHY), INGUINAL: Primary | ICD-10-CM

## 2018-04-26 NOTE — TELEPHONE ENCOUNTER
Called pt to inform of US and CT scan appt. Appt is May 9th [facility] hospital needs to arrive at 11am. No blood thinners or Aspirin 3-5 days prior to exam. No solid foods after 7am on the 9th. Pt verbalized understanding and was thankful for the call.

## 2018-04-26 NOTE — PROGRESS NOTES
Pt was called. I discussed with him that this was a lymph node and will touch base with Dr. Karine Lemon and call him back 4/26/18.

## 2018-05-09 ENCOUNTER — HOSPITAL ENCOUNTER (OUTPATIENT)
Dept: CT IMAGING | Age: 73
Discharge: HOME | End: 2018-05-09
Attending: INTERNAL MEDICINE
Payer: MEDICARE

## 2018-05-09 ENCOUNTER — HOSPITAL ENCOUNTER (OUTPATIENT)
Dept: ULTRASOUND IMAGING | Age: 73
Discharge: HOME | End: 2018-05-09
Attending: INTERNAL MEDICINE
Payer: MEDICARE

## 2018-05-09 DIAGNOSIS — R19.09 INGUINAL MASS: Primary | ICD-10-CM

## 2018-05-09 DIAGNOSIS — R59.0 LAD (LYMPHADENOPATHY), INGUINAL: ICD-10-CM

## 2018-05-09 DIAGNOSIS — R59.9 ENLARGED LYMPH NODE: ICD-10-CM

## 2018-05-09 PROCEDURE — 74011000250 HC RX REV CODE- 250: Performed by: RADIOLOGY

## 2018-05-09 PROCEDURE — 74011000258 HC RX REV CODE- 258: Performed by: INTERNAL MEDICINE

## 2018-05-09 PROCEDURE — 74011636320 HC RX REV CODE- 636/320: Performed by: INTERNAL MEDICINE

## 2018-05-09 PROCEDURE — 88173 CYTOPATH EVAL FNA REPORT: CPT | Performed by: RADIOLOGY

## 2018-05-09 PROCEDURE — 74177 CT ABD & PELVIS W/CONTRAST: CPT

## 2018-05-09 PROCEDURE — 71260 CT THORAX DX C+: CPT

## 2018-05-09 PROCEDURE — 76942 ECHO GUIDE FOR BIOPSY: CPT

## 2018-05-09 PROCEDURE — 88172 CYTP DX EVAL FNA 1ST EA SITE: CPT | Performed by: RADIOLOGY

## 2018-05-09 RX ORDER — LIDOCAINE HYDROCHLORIDE 10 MG/ML
5 INJECTION, SOLUTION EPIDURAL; INFILTRATION; INTRACAUDAL; PERINEURAL ONCE
Status: COMPLETED | OUTPATIENT
Start: 2018-05-09 | End: 2018-05-09

## 2018-05-09 RX ORDER — SODIUM CHLORIDE 0.9 % (FLUSH) 0.9 %
10 SYRINGE (ML) INJECTION
Status: COMPLETED | OUTPATIENT
Start: 2018-05-09 | End: 2018-05-09

## 2018-05-09 RX ADMIN — LIDOCAINE HYDROCHLORIDE 5 ML: 10 INJECTION, SOLUTION EPIDURAL; INFILTRATION; INTRACAUDAL; PERINEURAL at 14:34

## 2018-05-09 RX ADMIN — IOPAMIDOL 100 ML: 755 INJECTION, SOLUTION INTRAVENOUS at 14:01

## 2018-05-09 RX ADMIN — SODIUM CHLORIDE 100 ML: 900 INJECTION, SOLUTION INTRAVENOUS at 14:01

## 2018-05-09 RX ADMIN — Medication 10 ML: at 14:01

## 2018-05-09 RX ADMIN — IOHEXOL 50 ML: 240 INJECTION, SOLUTION INTRATHECAL; INTRAVASCULAR; INTRAVENOUS; ORAL at 14:01

## 2018-05-09 NOTE — DISCHARGE INSTRUCTIONS
Department of Radiology  [phone] Ultrasound Department  [phone] Emergency Department    BIOPSY DISCHARGE INSTRUCTIONS for Nikolai Vega on 5-9-2018    General Instructions:  - A biopsy is the removal of a small piece of tissue for microscopic examination or testing.  - Healthy tissue can be obtained for the purpose of tissue-type matching for transplants. - Unhealthy tissues are more commonly biopsied to diagnose disease. General Biopsy:  - A mass can grow in any area of the body, and we are taking a specimen as ordered by your doctor. - The risks are the same. They include bleeding, pain, and infection. Home Care Instructions:  - You may resume your regular diet and medication regimen. - You may use over the counter acetaminophen (Tylenol) or ibuprofen (Advil) for the soreness. - You may apply an ice pack to the affected area for 20-30 minutes at time for the first 24 hours. -- After that, you may apply a heat pack. - You may remove the bandaid(s) tonight. - You may take a shower tonight. - Please keep the site clean and dry for 24-48 hours. - Do not soak in any kind of water (bath tub, hot tub, pool, ocean, etc) for 24-48 hours. - Do not participate in any kind of activity making you vigorously sweat for 24-48 hours. - Do not use any moisturizer/makeup/perfume on the site for 24-48 hours. Call If:  - You should call your doctor if you have any questions or concerns about the biopsy site. - Call if you should have increased pain, fever, redness, drainage, or bleeding more than a small spot on the bandage.      Follow-Up Instructions:  - Please see your doctor as he has requested.        ______________________________                                                   ______________________________  Elizabeht Boogie

## 2018-05-14 ENCOUNTER — TELEPHONE (OUTPATIENT)
Dept: INTERNAL MEDICINE CLINIC | Age: 73
End: 2018-05-14

## 2018-05-14 DIAGNOSIS — I89.9 LYMPH NODE DISORDER: Primary | ICD-10-CM

## 2018-05-14 NOTE — TELEPHONE ENCOUNTER
----- Message from Elly Romero sent at 5/14/2018  4:43 PM EDT -----  Regarding: Dr Jacques Sullivan  Pt very anxious to get CT, BX results. Done at [facility] last WEd. Hopes to hear from you today.   His number is [phone]

## 2018-05-15 NOTE — TELEPHONE ENCOUNTER
Thank you. 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 CT of the leg may be considered. Johnita Marrow- please see how soon Dr. Polo Mcdonnell can see him.

## 2018-05-15 NOTE — TELEPHONE ENCOUNTER
Sanford Holt,   I spoke with pt and his wife. Mr. Janie Uriostegui would like to see Dr. Helen Bass if possible. He will follow up with you after seeing hematology. Kely Delaney,  I spoke with pt re: his biopsy results. Can you please send Dr. Helen Bass the pt's pathology results,  CT results and my note?      Thanks, Malcom Resendiz

## 2018-05-15 NOTE — TELEPHONE ENCOUNTER
Spoke with Dr Marilyn Herrera office for appointment, requested notes and all imaging reports sent and [place] office will call pt.  All requested notes sent ,

## 2018-05-23 ENCOUNTER — TELEPHONE (OUTPATIENT)
Dept: INTERNAL MEDICINE CLINIC | Age: 73
End: 2018-05-23

## 2018-05-23 NOTE — TELEPHONE ENCOUNTER
Patient is asking to speak with the nurse regarding him having Night Sweat last night. He dose not see Dr Mine Gamboa for while.  Would like a call back at [phone]

## 2018-05-23 NOTE — TELEPHONE ENCOUNTER
I spoke with patient, he states he has an appt with Dr. Magda Hernández at [facility] ER on June 12th. Pt states last night he had night sweats for the first time and he is concerned. He said he has never had those before and is concern. He wants to know if something needs to be done prior to his appt on June 12th? He asked if I could also call over to [facility] ER to see if they can get him in sooner. I called their office, I was transfer to new patient coordinator voicemail, I left a detail message asking if they have anything sooner. They will call back.

## 2018-05-23 NOTE — TELEPHONE ENCOUNTER
I spoke with [facility] appt is the Friday prior to their appt and its with another provider. Charis Da Silva MD's nurse reviewed pts records yesterday and felt that his appt on June 12th was fine. The patient can call his nurse Ellyn Jaquez with any questions or concerns prior to his appt. I called pt back and advised him of the above message. Pt will keep his appt on 06/12/18, he will call MD's nurse with his question/concern. Pt was thankful for the call.

## 2018-07-06 DIAGNOSIS — F41.9 ANXIETY: ICD-10-CM

## 2018-07-06 DIAGNOSIS — I10 ESSENTIAL HYPERTENSION: ICD-10-CM

## 2018-07-06 RX ORDER — LISINOPRIL 20 MG/1
TABLET ORAL
Qty: 90 TAB | Refills: 1 | Status: SHIPPED | OUTPATIENT
Start: 2018-07-06 | End: 2018-07-25 | Stop reason: DRUGHIGH

## 2018-07-06 RX ORDER — SIMVASTATIN 20 MG/1
TABLET, FILM COATED ORAL
Qty: 90 TAB | Refills: 1 | Status: SHIPPED | OUTPATIENT
Start: 2018-07-06 | End: 2019-02-01 | Stop reason: SDUPTHER

## 2018-07-25 RX ORDER — LISINOPRIL 5 MG/1
5 TABLET ORAL DAILY
Qty: 90 TAB | Refills: 1 | Status: ON HOLD | OUTPATIENT
Start: 2018-07-25 | End: 2019-05-08 | Stop reason: CLARIF

## 2019-02-01 DIAGNOSIS — F41.9 ANXIETY: ICD-10-CM

## 2019-02-01 RX ORDER — SIMVASTATIN 20 MG/1
TABLET, FILM COATED ORAL
Qty: 90 TAB | Refills: 0 | Status: SHIPPED | OUTPATIENT
Start: 2019-02-01 | End: 2019-04-26 | Stop reason: SDUPTHER

## 2019-03-06 PROBLEM — C73 THYROID CARCINOMA (HCC): Status: ACTIVE | Noted: 2019-03-04

## 2019-04-30 ENCOUNTER — TELEPHONE (OUTPATIENT)
Dept: INTERNAL MEDICINE CLINIC | Age: 74
End: 2019-04-30

## 2019-04-30 NOTE — TELEPHONE ENCOUNTER
Patient states he has a colonoscopy scheduled for 05/08 and needs to speak with the  Nurse to discuss which medications he should stop taking , he can be reached at [phone]

## 2019-05-08 ENCOUNTER — ANESTHESIA EVENT (OUTPATIENT)
Dept: ENDOSCOPY | Age: 74
End: 2019-05-08
Payer: MEDICARE

## 2019-05-08 ENCOUNTER — HOSPITAL ENCOUNTER (OUTPATIENT)
Age: 74
Setting detail: OUTPATIENT SURGERY
Discharge: HOME | End: 2019-05-08
Attending: INTERNAL MEDICINE | Admitting: INTERNAL MEDICINE
Payer: MEDICARE

## 2019-05-08 ENCOUNTER — ANESTHESIA (OUTPATIENT)
Dept: ENDOSCOPY | Age: 74
End: 2019-05-08
Payer: MEDICARE

## 2019-05-08 VITALS
BODY MASS INDEX: 24.66 KG/M2 | HEIGHT: 74 IN | SYSTOLIC BLOOD PRESSURE: 133 MMHG | WEIGHT: 192.13 LBS | DIASTOLIC BLOOD PRESSURE: 77 MMHG | OXYGEN SATURATION: 98 % | TEMPERATURE: 98 F | HEART RATE: 55 BPM | RESPIRATION RATE: 21 BRPM

## 2019-05-08 PROCEDURE — 88305 TISSUE EXAM BY PATHOLOGIST: CPT

## 2019-05-08 PROCEDURE — 74011250637 HC RX REV CODE- 250/637: Performed by: INTERNAL MEDICINE

## 2019-05-08 PROCEDURE — 76040000007: Performed by: INTERNAL MEDICINE

## 2019-05-08 PROCEDURE — 77030010937 HC CLP LIG BSC -I: Performed by: INTERNAL MEDICINE

## 2019-05-08 PROCEDURE — 77030013992 HC SNR POLYP ENDOSC BSC -B: Performed by: INTERNAL MEDICINE

## 2019-05-08 PROCEDURE — 74011250636 HC RX REV CODE- 250/636

## 2019-05-08 PROCEDURE — 77030027957 HC TBNG IRR ENDOGTR BUSS -B: Performed by: INTERNAL MEDICINE

## 2019-05-08 PROCEDURE — 77030010936 HC CLP LIG BSC -C: Performed by: INTERNAL MEDICINE

## 2019-05-08 PROCEDURE — 76060000032 HC ANESTHESIA 0.5 TO 1 HR: Performed by: INTERNAL MEDICINE

## 2019-05-08 PROCEDURE — 74011250636 HC RX REV CODE- 250/636: Performed by: INTERNAL MEDICINE

## 2019-05-08 RX ORDER — SODIUM CHLORIDE 0.9 % (FLUSH) 0.9 %
5-40 SYRINGE (ML) INJECTION AS NEEDED
Status: DISCONTINUED | OUTPATIENT
Start: 2019-05-08 | End: 2019-05-08 | Stop reason: HOSPADM

## 2019-05-08 RX ORDER — DEXTROMETHORPHAN/PSEUDOEPHED 2.5-7.5/.8
1.2 DROPS ORAL
Status: DISCONTINUED | OUTPATIENT
Start: 2019-05-08 | End: 2019-05-08 | Stop reason: HOSPADM

## 2019-05-08 RX ORDER — FLUMAZENIL 0.1 MG/ML
0.2 INJECTION INTRAVENOUS
Status: DISCONTINUED | OUTPATIENT
Start: 2019-05-08 | End: 2019-05-08 | Stop reason: HOSPADM

## 2019-05-08 RX ORDER — SODIUM CHLORIDE, SODIUM LACTATE, POTASSIUM CHLORIDE, CALCIUM CHLORIDE 600; 310; 30; 20 MG/100ML; MG/100ML; MG/100ML; MG/100ML
INJECTION, SOLUTION INTRAVENOUS
Status: DISCONTINUED | OUTPATIENT
Start: 2019-05-08 | End: 2019-05-08 | Stop reason: HOSPADM

## 2019-05-08 RX ORDER — LEVOTHYROXINE SODIUM 125 UG/1
125 TABLET ORAL
COMMUNITY
End: 2019-06-05

## 2019-05-08 RX ORDER — SODIUM CHLORIDE 0.9 % (FLUSH) 0.9 %
5-40 SYRINGE (ML) INJECTION EVERY 8 HOURS
Status: DISCONTINUED | OUTPATIENT
Start: 2019-05-08 | End: 2019-05-08 | Stop reason: HOSPADM

## 2019-05-08 RX ORDER — CARVEDILOL 3.12 MG/1
3.12 TABLET ORAL 2 TIMES DAILY WITH MEALS
COMMUNITY

## 2019-05-08 RX ORDER — EPINEPHRINE 0.1 MG/ML
1 INJECTION INTRACARDIAC; INTRAVENOUS
Status: DISCONTINUED | OUTPATIENT
Start: 2019-05-08 | End: 2019-05-08 | Stop reason: HOSPADM

## 2019-05-08 RX ORDER — MIDAZOLAM HYDROCHLORIDE 1 MG/ML
.25-1 INJECTION, SOLUTION INTRAMUSCULAR; INTRAVENOUS
Status: DISCONTINUED | OUTPATIENT
Start: 2019-05-08 | End: 2019-05-08 | Stop reason: HOSPADM

## 2019-05-08 RX ORDER — NALOXONE HYDROCHLORIDE 0.4 MG/ML
0.4 INJECTION, SOLUTION INTRAMUSCULAR; INTRAVENOUS; SUBCUTANEOUS
Status: DISCONTINUED | OUTPATIENT
Start: 2019-05-08 | End: 2019-05-08 | Stop reason: HOSPADM

## 2019-05-08 RX ORDER — SODIUM CHLORIDE 9 MG/ML
100 INJECTION, SOLUTION INTRAVENOUS CONTINUOUS
Status: DISCONTINUED | OUTPATIENT
Start: 2019-05-08 | End: 2019-05-08 | Stop reason: HOSPADM

## 2019-05-08 RX ORDER — FENTANYL CITRATE 50 UG/ML
200 INJECTION, SOLUTION INTRAMUSCULAR; INTRAVENOUS
Status: DISCONTINUED | OUTPATIENT
Start: 2019-05-08 | End: 2019-05-08 | Stop reason: HOSPADM

## 2019-05-08 RX ORDER — ATROPINE SULFATE 0.1 MG/ML
0.5 INJECTION INTRAVENOUS
Status: DISCONTINUED | OUTPATIENT
Start: 2019-05-08 | End: 2019-05-08 | Stop reason: HOSPADM

## 2019-05-08 RX ORDER — LIDOCAINE HYDROCHLORIDE 20 MG/ML
INJECTION, SOLUTION EPIDURAL; INFILTRATION; INTRACAUDAL; PERINEURAL AS NEEDED
Status: DISCONTINUED | OUTPATIENT
Start: 2019-05-08 | End: 2019-05-08 | Stop reason: HOSPADM

## 2019-05-08 RX ORDER — PROPOFOL 10 MG/ML
INJECTION, EMULSION INTRAVENOUS AS NEEDED
Status: DISCONTINUED | OUTPATIENT
Start: 2019-05-08 | End: 2019-05-08 | Stop reason: HOSPADM

## 2019-05-08 RX ADMIN — PROPOFOL 30 MG: 10 INJECTION, EMULSION INTRAVENOUS at 13:56

## 2019-05-08 RX ADMIN — LIDOCAINE HYDROCHLORIDE 40 MG: 20 INJECTION, SOLUTION EPIDURAL; INFILTRATION; INTRACAUDAL; PERINEURAL at 13:35

## 2019-05-08 RX ADMIN — PROPOFOL 30 MG: 10 INJECTION, EMULSION INTRAVENOUS at 13:49

## 2019-05-08 RX ADMIN — PROPOFOL 30 MG: 10 INJECTION, EMULSION INTRAVENOUS at 13:47

## 2019-05-08 RX ADMIN — PROPOFOL 30 MG: 10 INJECTION, EMULSION INTRAVENOUS at 13:39

## 2019-05-08 RX ADMIN — PROPOFOL 30 MG: 10 INJECTION, EMULSION INTRAVENOUS at 13:37

## 2019-05-08 RX ADMIN — PROPOFOL 30 MG: 10 INJECTION, EMULSION INTRAVENOUS at 13:43

## 2019-05-08 RX ADMIN — PROPOFOL 30 MG: 10 INJECTION, EMULSION INTRAVENOUS at 13:41

## 2019-05-08 RX ADMIN — PROPOFOL 30 MG: 10 INJECTION, EMULSION INTRAVENOUS at 13:51

## 2019-05-08 RX ADMIN — SODIUM CHLORIDE, SODIUM LACTATE, POTASSIUM CHLORIDE, CALCIUM CHLORIDE: 600; 310; 30; 20 INJECTION, SOLUTION INTRAVENOUS at 13:34

## 2019-05-08 RX ADMIN — PROPOFOL 30 MG: 10 INJECTION, EMULSION INTRAVENOUS at 13:45

## 2019-05-08 RX ADMIN — PROPOFOL 70 MG: 10 INJECTION, EMULSION INTRAVENOUS at 13:35

## 2019-05-08 RX ADMIN — PROPOFOL 30 MG: 10 INJECTION, EMULSION INTRAVENOUS at 13:53

## 2019-05-08 NOTE — H&P
[address] History and Physical    
 
NAME:  Rasta Rose :   1945 MRN:   [id number] History of Present Illness:  Patient is a 68 y.o. who is seen for screening for colon cancer. PMH: 
Past Medical History:  
Diagnosis Date  Anxiety 2014  Back muscle spasm 2014  Congenital cataract   
 surgery as child  Glaucoma (increased eye pressure) Dr. Candelario Valentin and Zachariah Ray  HTN (hypertension)  Hyperlipidemia LDL goal < 130 2012  Mass of left inguinal region 2018  
 lymphoma possible. Dr. Ny Munson.  Thyroid carcinoma ([address] 2019 Dr. Phan Vance, Dr. Rosalina Meraz. papillary vs anaplastic  Tubulovillous adenoma of colon 2009 TV adenoma, repeat 10/2012 done PSH: 
Past Surgical History:  
Procedure Laterality Date  COLONOSCOPY  09  
 1.1 cm tubulovillous adenoma, tublar adenoma. Dr. Aurelio Garza  HX CATARACT REMOVAL    
 as child  HX COLONOSCOPY  10/31/2012  
 polyp - tubular adenoma  HX OTHER SURGICAL    
 removal of thyroid ca  HX OTHER SURGICAL    
 port-a-cath  HX TONSILLECTOMY Allergies: Allergies Allergen Reactions  Mercury (Bulk) Rash  Pcn [Penicillins] Rash As a child; can take Cephalosporins without reaction Home Medications: 
Prior to Admission Medications Prescriptions Last Dose Informant Patient Reported? Taking? ALPRAZolam (XANAX) 0.25 mg tablet 2019 at Unknown time  No Yes Sig: Take 1 Tab by mouth two (2) times daily as needed for Anxiety. TURMERIC, BULK, 2019  Yes No  
Sig: by Does Not Apply route. carvedilol (COREG) 3.125 mg tablet 2019 at Unknown time  Yes Yes Sig: Take 3.125 mg by mouth two (2) times daily (with meals). cyclobenzaprine (FLEXERIL) 10 mg tablet 2019 at Unknown time  No Yes Sig: Take 1 Tab by mouth three (3) times daily as needed for Muscle Spasm(s). dorzolamide (TRUSOPT) 2 % ophthalmic solution 2019 at Unknown time  Yes Yes Sig: Administer 1 Drop to right eye. Twice daily  
levothyroxine (SYNTHROID) 125 mcg tablet 2019 at Unknown time  Yes Yes Sig: Take 125 mcg by mouth Daily (before breakfast). loteprednol etabonate (LOTEMAX) 0.5 % ophthalmic suspension 2019 at Unknown time  Yes Yes Sig: Administer 1 Drop to left eye. Once daily  
simvastatin (ZOCOR) 20 mg tablet 2019 at Unknown time  No Yes Sig: TAKE 1 TABLET BY MOUTH EVERY EVENING Facility-Administered Medications: None Hospital Medications: 
Current Facility-Administered Medications Medication Dose Route Frequency  0.9% sodium chloride infusion  100 mL/hr IntraVENous CONTINUOUS  
 sodium chloride (NS) flush 5-40 mL  5-40 mL IntraVENous Q8H  
 sodium chloride (NS) flush 5-40 mL  5-40 mL IntraVENous PRN  
 midazolam (VERSED) injection 0.25-10 mg  0.25-10 mg IntraVENous Multiple  fentaNYL citrate (PF) injection 200 mcg  200 mcg IntraVENous Multiple  naloxone (NARCAN) injection 0.4 mg  0.4 mg IntraVENous Multiple  flumazenil (ROMAZICON) 0.1 mg/mL injection 0.2 mg  0.2 mg IntraVENous Multiple  simethicone (MYLICON) 02JH/8.8SK oral drops 80 mg  1.2 mL Oral Multiple  atropine injection 0.5 mg  0.5 mg IntraVENous ONCE PRN  
 EPINEPHrine (ADRENALIN) 0.1 mg/mL syringe 1 mg  1 mg Endoscopically ONCE PRN Social History: 
Social History Tobacco Use  Smoking status: Former Smoker Packs/day: 0.50 Years: 2.00 Pack years: 1.00 Types: Cigarettes Last attempt to quit: 1975 Years since quittin.3  Smokeless tobacco: Never Used Substance Use Topics  Alcohol use: Yes Alcohol/week: 2.5 oz Types: 5 Standard drinks or equivalent per week Family History: 
Family History Problem Relation Age of Onset  Cancer Other   
     aunt  Stroke Mother   
     carotid dz  Emphysema Father Review of Systems: 
 
 
Constitutional: negative fever, negative chills, negative weight loss Eyes:   negative visual changes ENT:   negative sore throat, tongue or lip swelling Respiratory:  negative cough, negative dyspnea Cards:  negative for chest pain, palpitations, lower extremity edema GI:   See HPI 
:  negative for frequency, dysuria Integument:  negative for rash and pruritus Heme:  negative for easy bruising and gum/nose bleeding Musculoskel: negative for myalgias,  back pain and muscle weakness Neuro: negative for headaches, dizziness, vertigo Psych:  negative for feelings of anxiety, depression Objective:  
 
Patient Vitals for the past 8 hrs: 
 BP Temp Pulse Resp SpO2 Height Weight 05/08/19 1304 (!) 158/93 98 °F (36.7 °C) 64 18 100 % 6' 1.5\" (1.867 m) 87.1 kg (192 lb 2 oz) No intake/output data recorded. No intake/output data recorded. EXAM:   
 NEURO-a&o HEENT-wnl LUNGS-clear COR-regular rate and rhythym ABD-soft , no tenderness, no rebound, good bs EXT-no edema Data Review No results for input(s): WBC, HGB, HCT, PLT, HGBEXT, HCTEXT, PLTEXT in the last 72 hours. No results for input(s): NA, K, CL, CO2, BUN, CREA, GLU, PHOS, CA in the last 72 hours. No results for input(s): SGOT, GPT, AP, TBIL, TP, ALB, GLOB, GGT, AML, LPSE in the last 72 hours. No lab exists for component: AMYP, HLPSE No results for input(s): INR, PTP, APTT in the last 72 hours. No lab exists for component: INREXT Assessment:  
· Screening colon cancer Patient Active Problem List  
Diagnosis Code  Glaucoma (increased eye pressure) H40.9  
 HTN (hypertension) I10  
 Tubulovillous adenoma of colon D12.6  Hyperlipidemia with target LDL less than 130 E78.5  Back muscle spasm M62.830  
 Anxiety F41.9  Advanced care planning/counseling discussion Z70.80  
 Mass of left inguinal region R19.09  Thyroid carcinoma ([address] M25 Plan: · Endoscopic procedure with sedation Signed By: Lolly Santiago MD   
 5/8/2019  1:32 PM

## 2019-05-08 NOTE — DISCHARGE INSTRUCTIONS
[address], [address] [place] [address]    COLON DISCHARGE INSTRUCTIONS    Constanza Mulligan  [id number]  1945    Discomfort:  Redness at IV site- apply warm compress to area; if redness or soreness persist- contact your physician  There may be a slight amount of blood passed from the rectum  Gaseous discomfort- walking, belching will help relieve any discomfort  You may not operate a vehicle for 12 hours  You may not engage in an occupation involving machinery or appliances for rest of today  You may not drink alcoholic beverages for at least 12 hours  Avoid making any critical decisions for at least 24 hour  DIET:  You may resume your regular diet - however -  remember your colon is empty and a heavy meal will produce gas. Avoid these foods:  vegetables, fried / greasy foods, carbonated drinks     ACTIVITY:  You may  resume your normal daily activities it is recommended that you spend the remainder of the day resting -  avoid any strenuous activity. CALL M.D. ANY SIGN OF:   Increasing pain, nausea, vomiting  Abdominal distension (swelling)  New increased bleeding (oral or rectal)  Fever (chills)  Pain in chest area  Bloody discharge from nose or mouth  Shortness of breath      Follow-up Instructions:   Call Dr. Pilar Wilson for any questions or problems at [phone]   High fiber diet          ENDOSCOPY FINDINGS:   Your colonoscopy showed 2 polyps removed and diverticulosis.   Telephone # [phone]      Signed By: Pilar Wilson MD     5/8/2019  2:05 PM       DISCHARGE SUMMARY from Nurse    The following personal items collected during your admission are returned to you:   Dental Appliance: Dental Appliances: None  Vision: Visual Aid: Glasses  Hearing Aid:    Jewelry:    Clothing:    Other Valuables:    Valuables sent to safe:    Patient Education   Patient Education        Diverticulosis: Care Instructions  Your Care Instructions  In diverticulosis, pouches called diverticula form in the wall of the large intestine (colon). The pouches do not cause any pain or other symptoms. Most people who have diverticulosis do not know they have it. But the pouches sometimes bleed, and if they become infected, they can cause pain and other symptoms. When this happens, it is called diverticulitis. Diverticula form when pressure pushes the wall of the colon outward at certain weak points. A diet that is too low in fiber can cause diverticula. Follow-up care is a key part of your treatment and safety. Be sure to make and go to all appointments, and call your doctor if you are having problems. It's also a good idea to know your test results and keep a list of the medicines you take. How can you care for yourself at home? · Include fruits, leafy green vegetables, beans, and whole grains in your diet each day. These foods are high in fiber. · Take a fiber supplement, such as Citrucel or Metamucil, every day if needed. Read and follow all instructions on the label. · Drink plenty of fluids, enough so that your urine is light yellow or clear like water. If you have kidney, heart, or liver disease and have to limit fluids, talk with your doctor before you increase the amount of fluids you drink. · Get at least 30 minutes of exercise on most days of the week. Walking is a good choice. You also may want to do other activities, such as running, swimming, cycling, or playing tennis or team sports. · Cut out foods that cause gas, pain, or other symptoms. When should you call for help?   Call your doctor now or seek immediate medical care if:    · You have belly pain.     · You pass maroon or very bloody stools.     · You have a fever.     · You have nausea and vomiting.     · You have unusual changes in your bowel movements or abdominal swelling.     · You have burning pain when you urinate.     · You have abnormal vaginal discharge.     · You have shoulder pain.     · You have cramping pain that does not get better when you have a bowel movement or pass gas.     · You pass gas or stool from your urethra while urinating.    Watch closely for changes in your health, and be sure to contact your doctor if you have any problems. Where can you learn more? Go to http://mellissa-breanna.info/. Enter R543 in the search box to learn more about \"Diverticulosis: Care Instructions. \"  Current as of: March 27, 2018  Content Version: 11.9  © 3835-4990 [employer]. Care instructions adapted under license by [employer] (which disclaims liability or warranty for this information). If you have questions about a medical condition or this instruction, always ask your healthcare professional. [address] any warranty or liability for your use of this information. Colon Polyps: Care Instructions  Your Care Instructions    Colon polyps are growths in the colon or the rectum. The cause of most colon polyps is not known, and most people who get them do not have any problems. But a certain kind can turn into cancer. For this reason, regular testing for colon polyps is important for people age 48 and older and anyone who has an increased risk for colon cancer. Polyps are usually found through routine colon cancer screening tests. Although most colon polyps are not cancerous, they are usually removed and then tested for cancer. Screening for colon cancer saves lives because the cancer can usually be cured if it is caught early. If you have a polyp that is the type that can turn into cancer, you may need more tests to examine your entire colon. The doctor will remove any other polyps that he or she finds, and you will be tested more often. Follow-up care is a key part of your treatment and safety. Be sure to make and go to all appointments, and call your doctor if you are having problems.  It's also a good idea to know your test results and keep a list of the medicines you take. How can you care for yourself at home? Regular exams to look for colon polyps are the best way to prevent polyps from turning into colon cancer. These can include stool tests, sigmoidoscopy, colonoscopy, and CT colonography. Talk with your doctor about a testing schedule that is right for you. To prevent polyps  There is no home treatment that can prevent colon polyps. But these steps may help lower your risk for cancer. · Stay active. Being active can help you get to and stay at a healthy weight. Try to exercise on most days of the week. Walking is a good choice. · Eat well. Choose a variety of vegetables, fruits, legumes (such as peas and beans), fish, poultry, and whole grains. · Do not smoke. If you need help quitting, talk to your doctor about stop-smoking programs and medicines. These can increase your chances of quitting for good. · If you drink alcohol, limit how much you drink. Limit alcohol to 2 drinks a day for men and 1 drink a day for women. When should you call for help? Call your doctor now or seek immediate medical care if:    · You have severe belly pain.     · Your stools are maroon or very bloody.    Watch closely for changes in your health, and be sure to contact your doctor if:    · You have a fever.     · You have nausea or vomiting.     · You have a change in bowel habits (new constipation or diarrhea).     · Your symptoms get worse or are not improving as expected. Where can you learn more? Go to http://mellissa-breanna.info/. Enter 95 [id number] in the search box to learn more about \"Colon Polyps: Care Instructions. \"  Current as of: March 27, 2018  Content Version: 11.9  © 2818-2853 [employer]. Care instructions adapted under license by [employer] (which disclaims liability or warranty for this information).  If you have questions about a medical condition or this instruction, always ask your healthcare professional. [employer] disclaims any warranty or liability for your use of this information.

## 2019-05-08 NOTE — ANESTHESIA POSTPROCEDURE EVALUATION
Post-Anesthesia Evaluation and Assessment Patient: Nidia Cano MRN: [id number]  SSN: [id number] YOB: 1945  Age: 68 y.o. Sex: male I have evaluated the patient and they are stable and ready for discharge from the PACU. Cardiovascular Function/Vital Signs Visit Vitals /60 Pulse 65 Temp 36.7 °C (98 °F) Resp 18 Ht 6' 1.5\" (1.867 m) Wt 87.1 kg (192 lb 2 oz) SpO2 100% BMI 25.00 kg/m² Patient is status post MAC anesthesia for Procedure(s): 
COLONOSCOPY 
ENDOSCOPIC POLYPECTOMY RESOLUTION CLIP. Nausea/Vomiting: None Postoperative hydration reviewed and adequate. Pain: 
Pain Scale 1: Numeric (0 - 10) (05/08/19 1408) Pain Intensity 1: 3 (05/08/19 1408) Managed Neurological Status: At baseline Mental Status, Level of Consciousness: Alert and  oriented to person, place, and time Pulmonary Status:  
O2 Device: Room air (05/08/19 1408) Adequate oxygenation and airway patent Complications related to anesthesia: None Post-anesthesia assessment completed. No concerns Signed By: Estee Morrell MD   
 May 8, 2019 Procedure(s): 
COLONOSCOPY 
ENDOSCOPIC POLYPECTOMY RESOLUTION CLIP. MAC 
 
<BSHSIANPOST> Vitals Value Taken Time /60 5/8/2019  2:08 PM  
Temp 36.7 °C (98 °F) 5/8/2019  2:08 PM  
Pulse 63 5/8/2019  2:10 PM  
Resp 21 5/8/2019  2:10 PM  
SpO2 100 % 5/8/2019  2:10 PM  
Vitals shown include unvalidated device data.

## 2019-05-08 NOTE — PROCEDURES
[address], [address] [place] [address]      Colonoscopy Operative Report    Nidia Cano  [id number]  1945      Procedure Type:   Colonoscopy with polypectomy (snare cautery)     Indications:    Personal history of colon polyps (screening only)   Date of last colonoscopy: 2012, Polyps  Yes by Dr. Power Styles    Pre-operative Diagnosis: see indication above    Post-operative Diagnosis:  See findings below    :  Jen Adrian MD    Surgical Assistant: None    Implants:  None    Referring Provider: Tone Mata MD      Sedation:  MAC anesthesia Propofol      Procedure Details:  After informed consent was obtained with all risks and benefits of procedure explained and preoperative exam completed, the patient was taken to the endoscopy suite and placed in the left lateral decubitus position. Upon sequential sedation as per above, a digital rectal exam was performed demonstrating internal hemorrhoids. The Olympus videocolonoscope  was inserted in the rectum and carefully advanced to the cecum, which was identified by the ileocecal valve and appendiceal orifice. The cecum was identified by the ileocecal valve and appendiceal orifice. The quality of preparation was good. The colonoscope was slowly withdrawn with careful evaluation between folds. Retroflexion in the rectum was completed . Findings:   Rectum: normal  Sigmoid: 1.5 cm polyp removed by hot snaring, one hemoclip placed at site    Moderate diverticulosis  Descending Colon: normal  Transverse Colon: normal  Ascending Colon: 2 cm polyp removed by hot snaring  Cecum: normal        Specimen Removed:  as above    Complications: None. EBL:  None. Impression:    see findings    Recommendations: --Await pathology.       Recommendation for next colonscopy in 3 years  High fiber diet    Signed By: Jen Adrian MD     5/8/2019  2:03 PM

## 2019-05-08 NOTE — ROUTINE PROCESS
Beryle Cocks 1945 
[id number] Situation: 
Verbal report received from: [place] 
Procedure: Procedure(s): 
COLONOSCOPY 
ENDOSCOPIC POLYPECTOMY RESOLUTION CLIP Background: 
 
Preoperative diagnosis: HX OF POLYPS Postoperative diagnosis: colon polyps, diverticulosis :  Dr. Carlos Enrique Solorio Assistant(s): Endoscopy Technician-1: Augusta Guardado IV 
Endoscopy RN-1: Gary oPlanco RN Specimens:  
ID Type Source Tests Collected by Time Destination 1 : pathology Preservative Colon, Ascending  Pradeep Justin MD 5/8/2019 1349 Pathology 2 : pathology Preservative Sigmoid  Pradeep Justin MD 5/8/2019 1356 Pathology H. Pylori  no Assessment: 
Intra-procedure medications Anesthesia gave intra-procedure sedation and medications, see anesthesia flow sheet yes Intravenous fluids: NS@ [facility] Vital signs stable Abdominal assessment: round and soft Recommendation: 
Discharge patient per MD order. Family or Friend Spouse Permission to share finding with family or friend yes

## 2019-05-08 NOTE — PROGRESS NOTES
Care of patient assumed from the anesthesia provider. Patient tolerated procedure well. Abdomen remains soft and non tender post procedure, no complaints or indication of discomfort noted at this time. See anesthesia note. Requested medication: methotrexate 2.5 MG Oral Tab  Last ordered: 8/20/2018 #40 with 3 refills    LOV: 11/20/2018  No future appointments.   Labs:       Component      Latest Ref Rng & Units 10/22/2018   Glucose      70 - 99 mg/dL 108 (H)   Sodium      136

## 2019-05-08 NOTE — ANESTHESIA PREPROCEDURE EVALUATION
Relevant Problems No relevant active problems Anesthetic History No history of anesthetic complications Review of Systems / Medical History Patient summary reviewed, nursing notes reviewed and pertinent labs reviewed Pulmonary Within defined limits Neuro/Psych Within defined limits Cardiovascular Hypertension Exercise tolerance: >4 METS 
  
GI/Hepatic/Renal 
  
 
 
 
 
 
 Endo/Other Hypothyroidism Other Findings Physical Exam 
 
Airway Mallampati: I 
TM Distance: > 6 cm Neck ROM: normal range of motion Mouth opening: Normal 
 
 Cardiovascular Rhythm: regular Rate: normal 
 
 
 
 Dental 
No notable dental hx Pulmonary Breath sounds clear to auscultation Abdominal 
 
 
 
 Other Findings Anesthetic Plan ASA: 2 Anesthesia type: MAC Induction: Intravenous Anesthetic plan and risks discussed with: Patient

## 2019-06-05 ENCOUNTER — HOSPITAL ENCOUNTER (OUTPATIENT)
Dept: LAB | Age: 74
Discharge: HOME | End: 2019-06-05
Payer: MEDICARE

## 2019-06-05 ENCOUNTER — OFFICE VISIT (OUTPATIENT)
Dept: INTERNAL MEDICINE CLINIC | Age: 74
End: 2019-06-05

## 2019-06-05 VITALS
WEIGHT: 201.2 LBS | TEMPERATURE: 97.8 F | DIASTOLIC BLOOD PRESSURE: 89 MMHG | BODY MASS INDEX: 25.82 KG/M2 | HEART RATE: 60 BPM | HEIGHT: 74 IN | RESPIRATION RATE: 18 BRPM | OXYGEN SATURATION: 97 % | SYSTOLIC BLOOD PRESSURE: 140 MMHG

## 2019-06-05 DIAGNOSIS — E89.0 POST-SURGICAL HYPOTHYROIDISM: ICD-10-CM

## 2019-06-05 DIAGNOSIS — F41.9 ANXIETY: ICD-10-CM

## 2019-06-05 DIAGNOSIS — I10 ESSENTIAL HYPERTENSION: ICD-10-CM

## 2019-06-05 DIAGNOSIS — Z00.00 MEDICARE ANNUAL WELLNESS VISIT, SUBSEQUENT: Primary | ICD-10-CM

## 2019-06-05 DIAGNOSIS — C73 THYROID CARCINOMA (HCC): ICD-10-CM

## 2019-06-05 DIAGNOSIS — Z13.39 SCREENING FOR ALCOHOLISM: ICD-10-CM

## 2019-06-05 DIAGNOSIS — Z13.31 SCREENING FOR DEPRESSION: ICD-10-CM

## 2019-06-05 DIAGNOSIS — Z12.5 SPECIAL SCREENING FOR MALIGNANT NEOPLASM OF PROSTATE: ICD-10-CM

## 2019-06-05 DIAGNOSIS — Z23 ENCOUNTER FOR IMMUNIZATION: ICD-10-CM

## 2019-06-05 DIAGNOSIS — T45.1X5A CARDIOMYOPATHY DUE TO CHEMOTHERAPY (HCC): ICD-10-CM

## 2019-06-05 DIAGNOSIS — I42.7 CARDIOMYOPATHY DUE TO CHEMOTHERAPY (HCC): ICD-10-CM

## 2019-06-05 DIAGNOSIS — M62.830 BACK MUSCLE SPASM: ICD-10-CM

## 2019-06-05 DIAGNOSIS — C81.93 HODGKIN LYMPHOMA OF INTRA-ABDOMINAL LYMPH NODES, UNSPECIFIED HODGKIN LYMPHOMA TYPE (HCC): ICD-10-CM

## 2019-06-05 DIAGNOSIS — Q12.0 CONGENITAL CATARACT OF BOTH EYES: ICD-10-CM

## 2019-06-05 PROCEDURE — 36415 COLL VENOUS BLD VENIPUNCTURE: CPT

## 2019-06-05 PROCEDURE — 84153 ASSAY OF PSA TOTAL: CPT

## 2019-06-05 RX ORDER — CYCLOBENZAPRINE HCL 10 MG
10 TABLET ORAL
Qty: 30 TAB | Refills: 1 | Status: SHIPPED | OUTPATIENT
Start: 2019-06-05 | End: 2020-07-09 | Stop reason: ALTCHOICE

## 2019-06-05 RX ORDER — LEVOTHYROXINE SODIUM 112 UG/1
112 TABLET ORAL
Qty: 30 TAB | Refills: 5
Start: 2019-06-05

## 2019-06-05 RX ORDER — LISINOPRIL 5 MG/1
TABLET ORAL DAILY
COMMUNITY
End: 2020-07-09 | Stop reason: ALTCHOICE

## 2019-06-05 RX ORDER — ALPRAZOLAM 0.25 MG/1
0.25 TABLET ORAL
Qty: 21 TAB | Refills: 0 | Status: SHIPPED | OUTPATIENT
Start: 2019-06-05 | End: 2020-07-09 | Stop reason: ALTCHOICE

## 2019-06-05 NOTE — PROGRESS NOTES
This is the Subsequent Medicare Annual Wellness Exam, performed 12 months or more after the Initial AWV or the last Subsequent AWV    I have reviewed the patient's medical history in detail and updated the computerized patient record. He has had a complicated recent history. Was diagnosed 1 year ago with Hodgkin's lymphoma. He is seeing Dr. Radha Howell for this. All of his lymphadenopathy was below the diaphragm in the abdomen. Repeat CT scan shows stable disease. Completed chemotherapy. 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 He is taking a daily vitamin for this. 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 Yunior Wilhelm. He is following up with them. Denies any significant edema, chest pain or orthopnea. Incidentally, a PET scan be picked up a thyroid nodule which was suspicious. Was diagnosed with thyroid carcinoma in March and had a thyroidectomy with Dr. Wale Khoury. He is now seeing Dr. Dexter Ramirez for hypothyroidism postsurgically. On levothyroxine which was recently decreased. Hyperlipidemia. Labs done with Dr. Tomi Alan showed stable lipids. He is taking simvastatin 20 mg. Hypertension  Hypertension ROS: taking medications as instructed, no medication side effects noted, no TIA's, no chest pain on exertion, no dyspnea on exertion, no swelling of ankles     reports that he quit smoking about 44 years ago. His smoking use included cigarettes. He has a 1.00 pack-year smoking history. He has never used smokeless tobacco.    reports that he drinks about 2.5 oz of alcohol per week.    BP Readings from Last 2 Encounters:   06/05/19 140/89   05/08/19 133/77       History     Past Medical History:   Diagnosis Date    Anxiety 1/9/2014    Back muscle spasm 1/9/2014    Congenital cataract     surgery as child    Glaucoma (increased eye pressure)     Dr. Allie Lyn and [facility])     Dr. Harini Shepard HTN (hypertension)     Hyperlipidemia LDL goal < 130 1/6/2012    Mass of left inguinal region 05/2018    lymphoma possible. Dr. Ashwin Chu.  Neuropathy due to chemotherapeutic drug [facility])     Post-surgical hypothyroidism 03/2019    Dr. Fariba Mena    Thyroid carcinoma [facility]) 03/04/2019    Dr. An Venegas thyroidectomy. Dr. Fariba Mena, Dr. Jennifer Lowe. 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 Dr. Kd Foster    COLONOSCOPY N/A 5/8/2019    2 cm and 1.5 cm tubular adenomas. COLONOSCOPY performed by Jonnie Sahu MD at [facility] ENDOSCOPY    HX CATARACT REMOVAL      as child    HX COLONOSCOPY  10/31/2012    polyp - tubular adenoma    HX OTHER SURGICAL      port-a-cath    HX THYROIDECTOMY  03/08/2019    cancer. Dr. Brittni Torrez HX TONSILLECTOMY       Current Outpatient Medications   Medication Sig Dispense Refill    vit B Cmplx 3-FA-Vit C-Biotin (NEPHRO SHWETA RX) 1- mg-mg-mcg tablet Take 1 Tab by mouth daily.  lisinopril (PRINIVIL, ZESTRIL) 5 mg tablet Take  by mouth daily.  carvedilol (COREG) 3.125 mg tablet Take 3.125 mg by mouth two (2) times daily (with meals).  levothyroxine (SYNTHROID) 125 mcg tablet Take 125 mcg by mouth Daily (before breakfast).  simvastatin (ZOCOR) 20 mg tablet TAKE 1 TABLET BY MOUTH EVERY EVENING 90 Tab 1    TURMERIC, BULK, by Does Not Apply route.  cyclobenzaprine (FLEXERIL) 10 mg tablet Take 1 Tab by mouth three (3) times daily as needed for Muscle Spasm(s). 30 Tab 1    loteprednol etabonate (LOTEMAX) 0.5 % ophthalmic suspension Administer 1 Drop to left eye. Once daily      dorzolamide (TRUSOPT) 2 % ophthalmic solution Administer 1 Drop to right eye.  Twice daily       Allergies   Allergen Reactions    Mercury (Bulk) Rash    Pcn [Penicillins] Rash     As a child; can take Cephalosporins without reaction     Family History   Problem Relation Age of Onset    Cancer Other         aunt    Stroke Mother         carotid dz    Emphysema Father      Social History     Tobacco Use    Smoking status: Former Smoker     Packs/day: 0.50     Years: 2.00     Pack years: 1.00     Types: Cigarettes     Last attempt to quit: 1975     Years since quittin.4    Smokeless tobacco: Never Used   Substance Use Topics    Alcohol use: Yes     Alcohol/week: 2.5 oz     Types: 5 Standard drinks or equivalent per week     Patient Active Problem List   Diagnosis Code    Glaucoma (increased eye pressure) H40.9    HTN (hypertension) I10    Tubulovillous adenoma of colon D12.6    Hyperlipidemia with target LDL less than 130 E78.5    Back muscle spasm M62.830    Anxiety F41.9    Advanced care planning/counseling discussion Z70.80    Mass of left inguinal region R19.09    Thyroid carcinoma ([facility] [address] C73    Neuropathy due to chemotherapeutic drug ([facility] [address] G62.0, T45.1X5A    Congenital cataract Q12.0    Hodgkin lymphoma ([facility] [address] C81.90    Post-surgical hypothyroidism E89.0       Depression Risk Factor Screening:     3 most recent PHQ Screens 2019   PHQ Not Done -   Little interest or pleasure in doing things Not at all   Feeling down, depressed, irritable, or hopeless Not at all   Total Score PHQ 2 0     Alcohol Risk Factor Screening: You do not drink alcohol or very rarely. Functional Ability and Level of Safety:   Hearing Loss  Hearing is good. Activities of Daily Living  The home contains: no safety equipment. Patient does total self care    Fall Risk  Fall Risk Assessment, last 12 mths 2019   Able to walk? Yes   Fall in past 12 months?  No   Fall with injury? -   Number of falls in past 12 months -   Fall Risk Score -       Abuse Screen  Patient is not abused    Cognitive Screening   Evaluation of Cognitive Function:  Has your family/caregiver stated any concerns about your memory: no  Normal    Patient Care Team   Patient Care Team:  Jonny Obregon MD as PCP - General (Internal Medicine)    Assessment/Plan   Education and counseling provided:  Are appropriate based on today's review and evaluation    Diagnoses and all orders for this visit:    1. Medicare annual wellness visit, subsequent    2. Encounter for immunization  -     ADMIN PNEUMOCOCCAL VACCINE  -     PNEUMOCOCCAL POLYSACCHARIDE VACCINE, 23-VALENT, ADULT OR IMMUNOSUPPRESSED PT DOSE,    3. Screening for depression  -     DEPRESSION SCREEN ANNUAL    4. Screening for alcoholism    5. Special screening for malignant neoplasm of prostate  -     PSA SCREENING (SCREENING)    6. Hodgkin lymphoma of intra-abdominal lymph nodes, unspecified Hodgkin lymphoma type ([facility] [address]  Continues to be doing well. Monitor closely by Dr. Dick Escalona and hematology. 7. Post-surgical hypothyroidism  On lower dose. Monitored by Dr. Gagandeep Hurtado.  -     levothyroxine (SYNTHROID) 112 mcg tablet; Take 1 Tab by mouth Daily (before breakfast). 8. Thyroid carcinoma ([facility] [address]  Status post surgery. Doing well. Monitored by Dr. Luanne Ram. 9. Congenital cataract of both eyes  Glaucoma. Following up with ophthalmology as scheduled. 10. Cardiomyopathy due to chemotherapy [facility])  Currently is asymptomatic. Taking lisinopril. Likely will improve. May be able to increase dose if needed. 11. Anxiety he has a distant history of anxiety but some -occasional panic attacks. He takes alprazolam very very sparingly. Discussed risks of benzodiazepine therapy.  reviewed. -     ALPRAZolam (XANAX) 0.25 mg tablet; Take 1 Tab by mouth two (2) times daily as needed for Anxiety. 12. Back muscle spasm  -     cyclobenzaprine (FLEXERIL) 10 mg tablet; Take 1 Tab by mouth three (3) times daily as needed for Muscle Spasm(s). Indications: muscle spasm    13. Essential hypertension  Blood pressure is borderline controlled. He is seeing cardiology soon.       Discussed the patient's BMI with him. The BMI follow up plan is as follows:     dietary management education, guidance, and counseling  encourage exercise  monitor weight  prescribed dietary intake    An After Visit Summary was printed and given to the patient.

## 2019-06-05 NOTE — PATIENT INSTRUCTIONS
Medicare Wellness Visit, Male    The best way to live healthy is to have a lifestyle where you eat a well-balanced diet, exercise regularly, limit alcohol use, and quit all forms of tobacco/nicotine, if applicable. Regular preventive services are another way to keep healthy. Preventive services (vaccines, screening tests, monitoring & exams) can help personalize your care plan, which helps you manage your own care. Screening tests can find health problems at the earliest stages, when they are easiest to treat. [facility] follows the current, evidence-based guidelines published by the [facility] ([facility]) when recommending preventive services for our patients. Because we follow these guidelines, sometimes recommendations change over time as research supports it. (For example, a prostate screening blood test is no longer routinely recommended for men with no symptoms.)  Of course, you and your doctor may decide to screen more often for some diseases, based on your risk and co-morbidities (chronic disease you are already diagnosed with). Preventive services for you include:  - Medicare offers their members a free annual wellness visit, which is time for you and your primary care provider to discuss and plan for your preventive service needs. Take advantage of this benefit every year!  -All adults over age 72 should receive the recommended pneumonia vaccines. Current USPSTF guidelines recommend a series of two vaccines for the best pneumonia protection.   -All adults should have a flu vaccine yearly and an ECG.  All adults age 61 and older should receive a shingles vaccine once in their lifetime.    -All adults age 38-68 who are overweight should have a diabetes screening test once every three years.   -Other screening tests & preventive services for persons with diabetes include: an eye exam to screen for diabetic retinopathy, a kidney function test, a foot exam, and stricter control over your cholesterol.   -Cardiovascular screening for adults with routine risk involves an electrocardiogram (ECG) at intervals determined by the provider.   -Colorectal cancer screening should be done for adults age 54-65 with no increased risk factors for colorectal cancer. There are a number of acceptable methods of screening for this type of cancer. Each test has its own benefits and drawbacks. Discuss with your provider what is most appropriate for you during your annual wellness visit. The different tests include: colonoscopy (considered the best screening method), a fecal occult blood test, a fecal DNA test, and sigmoidoscopy.  -All adults born between [facility] should be screened once for Hepatitis C.  -An Abdominal Aortic Aneurysm (AAA) Screening is recommended for men age 73-68 who has ever smoked in their lifetime. Here is a list of your current Health Maintenance items (your personalized list of preventive services) with a due date:  Health Maintenance Due   Topic Date Due    Pneumococcal Vaccine (2 of 2 - PPSV23) 02/09/2018          Body Mass Index: Care Instructions  Your Care Instructions    Body mass index (BMI) can help you see if your weight is raising your risk for health problems. It uses a formula to compare how much you weigh with how tall you are. · A BMI lower than 18.5 is considered underweight. · A BMI between 18.5 and 24.9 is considered healthy. · A BMI between 25 and 29.9 is considered overweight. A BMI of 30 or higher is considered obese. If your BMI is in the normal range, it means that you have a lower risk for weight-related health problems. If your BMI is in the overweight or obese range, you may be at increased risk for weight-related health problems, such as high blood pressure, heart disease, stroke, arthritis or joint pain, and diabetes.  If your BMI is in the underweight range, you may be at increased risk for health problems such as fatigue, lower protection (immunity) against illness, muscle loss, bone loss, hair loss, and hormone problems. BMI is just one measure of your risk for weight-related health problems. You may be at higher risk for health problems if you are not active, you eat an unhealthy diet, or you drink too much alcohol or use tobacco products. Follow-up care is a key part of your treatment and safety. Be sure to make and go to all appointments, and call your doctor if you are having problems. It's also a good idea to know your test results and keep a list of the medicines you take. How can you care for yourself at home? · Practice healthy eating habits. This includes eating plenty of fruits, vegetables, whole grains, lean protein, and low-fat dairy. · If your doctor recommends it, get more exercise. Walking is a good choice. Bit by bit, increase the amount you walk every day. Try for at least 30 minutes on most days of the week. · Do not smoke. Smoking can increase your risk for health problems. If you need help quitting, talk to your doctor about stop-smoking programs and medicines. These can increase your chances of quitting for good. · Limit alcohol to 2 drinks a day for men and 1 drink a day for women. Too much alcohol can cause health problems. If you have a BMI higher than 25  · Your doctor may do other tests to check your risk for weight-related health problems. This may include measuring the distance around your waist. A waist measurement of more than 40 inches in men or 35 inches in women can increase the risk of weight-related health problems. · Talk with your doctor about steps you can take to stay healthy or improve your health. You may need to make lifestyle changes to lose weight and stay healthy, such as changing your diet and getting regular exercise. If you have a BMI lower than 18.5  · Your doctor may do other tests to check your risk for health problems.   · Talk with your doctor about steps you can take to stay healthy or improve your health. You may need to make lifestyle changes to gain or maintain weight and stay healthy, such as getting more healthy foods in your diet and doing exercises to build muscle. Where can you learn more? Go to http://mellissa-breanna.info/. Enter S176 in the search box to learn more about \"Body Mass Index: Care Instructions. \"  Current as of: October 13, 2016  Content Version: 11.4  © 0694-0635 Health[employer], [employer]. Care instructions adapted under license by [employer] (which disclaims liability or warranty for this information). If you have questions about a medical condition or this instruction, always ask your healthcare professional. [address] any warranty or liability for your use of this information.

## 2019-06-06 LAB — PSA SERPL-MCNC: 1.4 NG/ML (ref 0–4)

## 2019-09-27 ENCOUNTER — HOSPITAL ENCOUNTER (EMERGENCY)
Age: 74
Discharge: HOME | End: 2019-09-27
Attending: EMERGENCY MEDICINE
Payer: MEDICARE

## 2019-09-27 ENCOUNTER — APPOINTMENT (OUTPATIENT)
Dept: CT IMAGING | Age: 74
End: 2019-09-27
Attending: EMERGENCY MEDICINE
Payer: MEDICARE

## 2019-09-27 VITALS
BODY MASS INDEX: 25.93 KG/M2 | HEART RATE: 60 BPM | TEMPERATURE: 98.3 F | RESPIRATION RATE: 16 BRPM | HEIGHT: 74 IN | SYSTOLIC BLOOD PRESSURE: 171 MMHG | DIASTOLIC BLOOD PRESSURE: 81 MMHG | WEIGHT: 202 LBS | OXYGEN SATURATION: 99 %

## 2019-09-27 DIAGNOSIS — S09.90XA CLOSED HEAD INJURY, INITIAL ENCOUNTER: ICD-10-CM

## 2019-09-27 DIAGNOSIS — R41.2 AMNESIA (RETROGRADE): ICD-10-CM

## 2019-09-27 DIAGNOSIS — S06.0X9A CONCUSSION WITH LOSS OF CONSCIOUSNESS, INITIAL ENCOUNTER: ICD-10-CM
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
## 2019-09-27 PROCEDURE — 74011000250 HC RX REV CODE- 250: Performed by: EMERGENCY MEDICINE

## 2019-09-27 PROCEDURE — 70450 CT HEAD/BRAIN W/O DYE: CPT

## 2019-09-27 PROCEDURE — 90471 IMMUNIZATION ADMIN: CPT

## 2019-09-27 PROCEDURE — 99283 EMERGENCY DEPT VISIT LOW MDM: CPT

## 2019-09-27 PROCEDURE — 70486 CT MAXILLOFACIAL W/O DYE: CPT

## 2019-09-27 PROCEDURE — 74011250637 HC RX REV CODE- 250/637: Performed by: EMERGENCY MEDICINE

## 2019-09-27 PROCEDURE — 74011250636 HC RX REV CODE- 250/636: Performed by: EMERGENCY MEDICINE

## 2019-09-27 PROCEDURE — 90715 TDAP VACCINE 7 YRS/> IM: CPT | Performed by: EMERGENCY MEDICINE

## 2019-09-27 PROCEDURE — 72125 CT NECK SPINE W/O DYE: CPT

## 2019-09-27 RX ORDER — ACETAMINOPHEN 500 MG
1000 TABLET ORAL ONCE
Status: COMPLETED | OUTPATIENT
Start: 2019-09-27 | End: 2019-09-27

## 2019-09-27 RX ORDER — BACITRACIN 500 UNIT/G
1 PACKET (EA) TOPICAL
Status: COMPLETED | OUTPATIENT
Start: 2019-09-27 | End: 2019-09-27

## 2019-09-27 RX ADMIN — TETANUS TOXOID, REDUCED DIPHTHERIA TOXOID AND ACELLULAR PERTUSSIS VACCINE, ADSORBED 0.5 ML: 5; 2.5; 8; 8; 2.5 SUSPENSION INTRAMUSCULAR at 13:31

## 2019-09-27 RX ADMIN — ACETAMINOPHEN 1000 MG: 500 TABLET ORAL at 13:30

## 2019-09-27 RX ADMIN — BACITRACIN 1 PACKET: 500 OINTMENT TOPICAL at 14:11

## 2019-09-27 NOTE — DISCHARGE INSTRUCTIONS
Patient Education        Concussion: Care Instructions  Your Care Instructions    A concussion is a kind of injury to the brain. It happens when the head receives a hard blow. The impact can jar or shake the brain against the skull. This interrupts the brain's normal activities. Although you may have cuts or bruises on your head or face, you may have no other visible signs of a brain injury. In most cases, damage to the brain from a concussion can't be seen in tests such as a CT or MRI scan. For a few weeks, you may have low energy, dizziness, trouble sleeping, a headache, ringing in your ears, or nausea. You may also feel anxious, grumpy, or depressed. You may have problems with memory and concentration. These symptoms are common after a concussion. They should slowly improve over time. Sometimes this takes weeks or even months. Someone who lives with you should know how to care for you. Please share this and all information with a caregiver who will be available to help if needed. Follow-up care is a key part of your treatment and safety. Be sure to make and go to all appointments, and call your doctor if you are having problems. It's also a good idea to know your test results and keep a list of the medicines you take. How can you care for yourself at home? Pain control  · Put ice or a cold pack on the part of your head that hurts for 10 to 20 minutes at a time. Put a thin cloth between the ice and your skin. · Be safe with medicines. Read and follow all instructions on the label. ? If the doctor gave you a prescription medicine for pain, take it as prescribed. ? If you are not taking a prescription pain medicine, ask your doctor if you can take an over-the-counter medicine. Recovery  · Follow your doctor's instructions. He or she will tell you if you need someone to watch you closely for the next 24 hours or longer. · Rest is the best way to recover from a concussion.  You need to rest your body and your brain:  ? Get plenty of sleep at night. And take rest breaks during the day. ? Avoid activities that take a lot of physical or mental work. This includes housework, exercise, schoolwork, video games, text messaging, and using the computer. ? You may need to change your school or work schedule while you recover. ? Return to your normal activities slowly. Do not try to do too much at once. · Do not drink alcohol or use illegal drugs. Alcohol and illegal drugs can slow your recovery. And they can increase your risk of a second brain injury. · Avoid activities that could lead to another concussion. Follow your doctor's instructions for a gradual return to activity and sports. · Ask your doctor when it's okay for you to drive a car, ride a bike, or operate machinery. How should you return to activity? Your return to activity can begin after 1 to 2 days of physical and mental rest. After resting, you can gradually increase your activity as long as it does not cause new symptoms or worsen your symptoms. Doctors and concussion specialists suggest steps to follow for returning to sports after a concussion. Use these steps as a guide. You should slowly progress through the following levels of activity:  1. Limited activity. You can take part in daily activities as long as the activity doesn't increase your symptoms or cause new symptoms. 2. Light aerobic activity. This can include walking, swimming, or other exercise at less than 70% of maximum heart rate. No resistance training is included in this step. 3. Sport-specific exercise. This includes running drills or skating drills (depending on the sport), but no head impact. 4. Noncontact training drills. This includes more complex training drills such as passing. The athlete may also begin light resistance training. 5. Full-contact practice. The athlete can participate in normal training. 6. Return to normal game play.  This is the final step and allows the athlete to join in normal game play. Watch and keep track of your progress. It should take at least 6 days for you to go from light activity to normal game play. Make sure that you can stay at each new level of activity for at least 24 hours without symptoms, or as long as your doctor says, before doing more. If one or more symptoms come back, return to a lower level of activity for at least 24 hours. Don't move on until all symptoms are gone. When should you call for help? Call 911 anytime you think you may need emergency care. For example, call if:    · You have a seizure.     · You passed out (lost consciousness).     · You are confused or can't stay awake.    Call your doctor now or seek immediate medical care if:    · You have new or worse vomiting.     · You feel less alert.     · You have new weakness or numbness in any part of your body.    Watch closely for changes in your health, and be sure to contact your doctor if:    · You do not get better as expected.     · You have new symptoms, such as headaches, trouble concentrating, or changes in mood. Where can you learn more? Go to http://mellissaEidoSearchbreanna.info/. Enter K755 in the search box to learn more about \"Concussion: Care Instructions. \"  Current as of: March 28, 2019  Content Version: 12.2  © 5754-9876 [employer]. Care instructions adapted under license by [employer] (which disclaims liability or warranty for this information). If you have questions about a medical condition or this instruction, always ask your healthcare professional. [place] [address] any warranty or liability for your use of this information. Patient Education        Learning About a Closed Head Injury  What is a closed head injury? A closed head injury happens when your head gets hit hard. The strong force of the blow causes your brain to shake in your skull.  This movement can cause the brain to bruise, swell, or tear. Sometimes nerves or blood vessels also get damaged. This can cause bleeding in or around the brain. A concussion is a type of closed head injury. What are the symptoms? If you have a mild concussion, you may have a mild headache or feel \"not quite right. \" These symptoms are common. They usually go away over a few days to 4 weeks. But sometimes after a concussion, you feel like you can't function as well as before the injury. And you have new symptoms. This is called postconcussive syndrome. You may:  · Find it harder to solve problems, think, concentrate, or remember. · Have headaches. · Have changes in your sleep patterns, such as not being able to sleep or sleeping all the time. · Have changes in your personality. · Not be interested in your usual activities. · Feel angry or anxious without a clear reason. · Lose your sense of taste or smell. · Be dizzy, lightheaded, or unsteady. It may be hard to stand or walk. How is a closed head injury treated? Any person who may have a concussion needs to see a doctor. Some people have to stay in the hospital to be watched. Others can go home safely. If you go home, follow your doctor's instructions. He or she will tell you if you need someone to watch you closely for the next 24 hours or longer. Rest is the best treatment. Get plenty of sleep at night. And try to rest during the day. · Avoid activities that are physically or mentally demanding. These include housework, exercise, and schoolwork. And don't play video games, send text messages, or use the computer. You may need to change your school or work schedule to be able to avoid these activities. · Ask your doctor when it's okay to drive, ride a bike, or operate machinery. · Take an over-the-counter pain medicine, such as acetaminophen (Tylenol), ibuprofen (Advil, Motrin), or naproxen (Aleve). Be safe with medicines. Read and follow all instructions on the label.   · Check with your doctor before you use any other medicines for pain. · Do not drink alcohol or use illegal drugs. They can slow recovery. They can also increase your risk of getting a second head injury. Follow-up care is a key part of your treatment and safety. Be sure to make and go to all appointments, and call your doctor if you are having problems. It's also a good idea to know your test results and keep a list of the medicines you take. Where can you learn more? Go to http://mellissa-breanna.info/. Enter E235 in the search box to learn more about \"Learning About a Closed Head Injury. \"  Current as of: March 28, 2019  Content Version: 12.2  © 0133-1148 [employer]. Care instructions adapted under license by [employer] (which disclaims liability or warranty for this information). If you have questions about a medical condition or this instruction, always ask your healthcare professional. [address] any warranty or liability for your use of this information.
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
## 2019-11-16 DIAGNOSIS — F41.9 ANXIETY: ICD-10-CM

## 2019-11-16 RX ORDER — SIMVASTATIN 20 MG/1
TABLET, FILM COATED ORAL
Qty: 90 TAB | Refills: 1 | Status: SHIPPED | OUTPATIENT
Start: 2019-11-16 | End: 2020-01-03 | Stop reason: ALTCHOICE

## 2020-01-03 RX ORDER — ATORVASTATIN CALCIUM 10 MG/1
10 TABLET, FILM COATED ORAL DAILY
Qty: 90 TAB | Refills: 1 | Status: SHIPPED | OUTPATIENT
Start: 2020-01-03 | End: 2020-04-01 | Stop reason: SDUPTHER

## 2020-04-01 ENCOUNTER — TELEPHONE (OUTPATIENT)
Dept: INTERNAL MEDICINE CLINIC | Age: 75
End: 2020-04-01

## 2020-04-01 RX ORDER — ATORVASTATIN CALCIUM 10 MG/1
10 TABLET, FILM COATED ORAL DAILY
Qty: 90 TAB | Refills: 1 | Status: SHIPPED | OUTPATIENT
Start: 2020-04-01 | End: 2020-07-09 | Stop reason: SDUPTHER

## 2020-07-09 ENCOUNTER — OFFICE VISIT (OUTPATIENT)
Dept: INTERNAL MEDICINE CLINIC | Age: 75
End: 2020-07-09

## 2020-07-09 ENCOUNTER — HOSPITAL ENCOUNTER (OUTPATIENT)
Dept: LAB | Age: 75
Discharge: HOME | End: 2020-07-09

## 2020-07-09 VITALS
SYSTOLIC BLOOD PRESSURE: 120 MMHG | HEART RATE: 56 BPM | TEMPERATURE: 98.7 F | DIASTOLIC BLOOD PRESSURE: 60 MMHG | WEIGHT: 210 LBS | RESPIRATION RATE: 18 BRPM | OXYGEN SATURATION: 96 % | HEIGHT: 74 IN | BODY MASS INDEX: 26.95 KG/M2

## 2020-07-09 DIAGNOSIS — Z12.5 SCREENING PSA (PROSTATE SPECIFIC ANTIGEN): ICD-10-CM

## 2020-07-09 DIAGNOSIS — C81.93 HODGKIN LYMPHOMA OF INTRA-ABDOMINAL LYMPH NODES, UNSPECIFIED HODGKIN LYMPHOMA TYPE (HCC): ICD-10-CM

## 2020-07-09 DIAGNOSIS — Z00.00 MEDICARE ANNUAL WELLNESS VISIT, SUBSEQUENT: Primary | ICD-10-CM

## 2020-07-09 DIAGNOSIS — T45.1X5A CARDIOMYOPATHY DUE TO CHEMOTHERAPY (HCC): ICD-10-CM

## 2020-07-09 DIAGNOSIS — G62.0 NEUROPATHY DUE TO CHEMOTHERAPEUTIC DRUG (HCC): ICD-10-CM

## 2020-07-09 DIAGNOSIS — E78.5 HYPERLIPIDEMIA WITH TARGET LDL LESS THAN 130: ICD-10-CM

## 2020-07-09 DIAGNOSIS — M62.830 BACK MUSCLE SPASM: ICD-10-CM

## 2020-07-09 DIAGNOSIS — C73 THYROID CARCINOMA (HCC): ICD-10-CM

## 2020-07-09 DIAGNOSIS — F41.9 ANXIETY: ICD-10-CM

## 2020-07-09 DIAGNOSIS — I42.7 CARDIOMYOPATHY DUE TO CHEMOTHERAPY (HCC): ICD-10-CM

## 2020-07-09 DIAGNOSIS — I10 ESSENTIAL HYPERTENSION: ICD-10-CM

## 2020-07-09 DIAGNOSIS — T45.1X5A NEUROPATHY DUE TO CHEMOTHERAPEUTIC DRUG (HCC): ICD-10-CM

## 2020-07-09 LAB
CHOLEST SERPL-MCNC: 171 MG/DL
HDLC SERPL-MCNC: 58 MG/DL
HDLC SERPL: 2.9 {RATIO} (ref 0–5)
LDLC SERPL CALC-MCNC: 81.8 MG/DL (ref 0–100)
LIPID PROFILE,FLP: ABNORMAL
TRIGL SERPL-MCNC: 156 MG/DL (ref ?–150)
VLDLC SERPL CALC-MCNC: 31.2 MG/DL

## 2020-07-09 RX ORDER — LOSARTAN POTASSIUM 25 MG/1
TABLET ORAL
COMMUNITY
End: 2021-07-12 | Stop reason: ALTCHOICE

## 2020-07-09 RX ORDER — ATORVASTATIN CALCIUM 10 MG/1
10 TABLET, FILM COATED ORAL DAILY
Qty: 90 TAB | Refills: 3 | Status: SHIPPED | OUTPATIENT
Start: 2020-07-09 | End: 2021-07-12 | Stop reason: SDUPTHER

## 2020-07-09 RX ORDER — CYCLOBENZAPRINE HCL 10 MG
10 TABLET ORAL
Qty: 30 TAB | Refills: 1 | Status: SHIPPED | OUTPATIENT
Start: 2020-07-09 | End: 2021-07-12 | Stop reason: SDUPTHER

## 2020-07-09 RX ORDER — ALPRAZOLAM 0.25 MG/1
0.25 TABLET ORAL
Qty: 21 TAB | Refills: 0 | Status: SHIPPED | OUTPATIENT
Start: 2020-07-09 | End: 2021-07-12 | Stop reason: SDUPTHER

## 2020-07-09 NOTE — PROGRESS NOTES
Chief Complaint   Patient presents with   [address] Questions   -During the past 4 weeks:   -how would you rate your health in general? Good   -how often have you been bothered by feeling dizzy when standing up? Mildly   -how much have you been bothered by bodily pain? not   -Have you noticed any hearing difficulties? no   -has your physical and emotional health limited your social activities with family or friends? no    Emotional Health Questions   -Do you have a history of depression, anxiety, or emotional problems? no  -Over the past 2 weeks, have you felt down, depressed or hopeless? no  -Over the past 2 weeks, have you felt little interest or pleasure in doing things? no    Health Habits   Please describe your diet habits: Balanced  Do you get 5 servings of fruits or vegetables daily? no  Do you exercise regularly? yes    Activities of Daily Living and Functional Status   -Do you need help with eating, walking, dressing, bathing, toileting, the phone, transportation, shopping, preparing meals, housework, laundry, medications or managing money? no  -In the past four weeks, was someone available to help you if you needed and wanted help with anything? yes  -Are you confident are you that you can control and manage most of your health problems? yes  -Have you been given information to help you keep track of your medications? yes  -How often do you have trouble taking your medications as prescribed? never    Fall Risk and Home Safety   Have you fallen 2 or more times in the past year? no  Does your home have rugs in the hallways? yes, Do you have grab bars in the bathrooms? yes, Does your home have handrails on the stairs? yes, Do you have adequate lighting in your home? yes  Do you have smoke detectors and check them regularly?  yes  Do you have difficulties driving a car/vehicle? no  Do you always fasten your seat belt when you are in a car? yes

## 2020-07-09 NOTE — PROGRESS NOTES
This is a Subsequent Medicare Annual Wellness Visit providing Personalized Prevention Plan Services (PPPS) (Performed 12 months after initial AWV and PPPS )    I have reviewed the patient's medical history in detail and updated the computerized patient record. History of cardiomyopathy. He established care with Dr. Wilmer Malloy last week. Echocardiogram he says was stable. No changes made to regimen. Hodgkin's lymphoma. He is seeing Dr. Derenda Rubinstein for follow-up. With history of thyroid carcinoma. Status post thyroidectomy March 2019 with Dr. Keira Feliciano. He sees Dr. René Yeager regularly for follow-up. Patient had labs done with Dr. René Yeager on June 29 which showed TSH 1.11, normal T4 and T3. Thyroglobulin antibody and DIANNE negative. Reports loose stools at times. Occurs since chemo in 2018. Gynecomastia has improved since he changed atorvastatin to simvastatin 1/2/20. Anxiety. He has intermittent spells of anxiety which are easily relieved with alprazolam 0.25 mg. Was prescribed 21 pills 6/10/19 and still has some leftover. Hyperlipidemia  Currently he takes atorvastatin 10 mg  ROS: taking medications as instructed, no medication side effects noted  No new myalgias, no joint pains, no weakness  No TIA's, no chest pain on exertion, no dyspnea on exertion, no swelling of ankles. Lab Results   Component Value Date/Time    Cholesterol, total 179 04/24/2018 12:00 AM    HDL Cholesterol 52 04/24/2018 12:00 AM    LDL, calculated 112 (H) 04/24/2018 12:00 AM    VLDL, calculated 15 04/24/2018 12:00 AM    Triglyceride 76 04/24/2018 12:00 AM             History     Past Medical History:   Diagnosis Date    Anxiety 1/9/2014    Back muscle spasm 1/9/2014    Cardiomyopathy due to chemotherapy [facility]) 2018    Dr. Wilmer Malloy 7/2020. echo 7/2020.   prior EF 50% per Dr. Alka Mcbride Congenital cataract     surgery as child    Glaucoma (increased eye pressure)     Dr. Catherine Vazquez Hodgkin lymphoma [facility]) 05/2018 Dr. Yonis Kellogg. s/p chemo. intraabdominal    HTN (hypertension)     Hyperlipidemia LDL goal < 130 1/6/2012    Mass of left inguinal region 05/2018    lymphoma possible. Dr. Yonis Kellogg.  Neuropathy due to chemotherapeutic drug [facility])     Post-surgical hypothyroidism 03/2019    Dr. Lucho Morales    Thyroid carcinoma [facility]) 03/04/2019    Dr. Mile Goss thyroidectomy. Dr. Lucho Morales, Dr. Magdalena Adkins. 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 Dr. Grace Maguire    COLONOSCOPY N/A 5/8/2019    2 cm and 1.5 cm tubular adenomas. [facility]COPY    HX CATARACT REMOVAL      as child    HX COLONOSCOPY  10/31/2012    polyp - tubular adenoma    HX OTHER SURGICAL      port-a-cath    HX THYROIDECTOMY  03/08/2019    cancer. Dr. Jeniffer Miles HX TONSILLECTOMY         Current Outpatient Medications   Medication Sig    losartan (COZAAR) 25 mg tablet 1 tablet Orally Once a day    atorvastatin (LIPITOR) 10 mg tablet Take 1 Tab by mouth daily. Replaces simvastatin 1/2/20 due to breast tenderness    vit B Cmplx 3-FA-Vit C-Biotin (NEPHRO SHWETA RX) 1- mg-mg-mcg tablet Take 1 Tab by mouth daily.  levothyroxine (SYNTHROID) 112 mcg tablet Take 1 Tab by mouth Daily (before breakfast).  carvedilol (COREG) 3.125 mg tablet Take 3.125 mg by mouth two (2) times daily (with meals).  TURMERIC, BULK, by Does Not Apply route.  loteprednol etabonate (LOTEMAX) 0.5 % ophthalmic suspension Administer 1 Drop to left eye. Once daily    dorzolamide (TRUSOPT) 2 % ophthalmic solution Administer 1 Drop to right eye. Twice daily     No current facility-administered medications for this visit.         Allergies   Allergen Reactions    Mercury (Bulk) Rash    Pcn [Penicillins] Rash     As a child; can take Cephalosporins without reaction       Family History   Problem Relation Age of Onset    Cancer Other         aunt    Stroke Mother         carotid dz    Emphysema Father         reports that he quit smoking about 45 years ago. His smoking use included cigarettes. He has a 1.00 pack-year smoking history. He has never used smokeless tobacco.   reports current alcohol use of about 4.2 standard drinks of alcohol per week. Depression Risk Factor Screening:       Alcohol Risk Factor Screening: On any occasion during the past 3 months, have you had more than 3 drinks containing alcohol? No    Do you average more than 14 drinks per week? No      Functional Ability and Level of Safety:     Hearing Loss   mild    Activities of Daily Living   Self-care. Requires assistance with: no ADLs    Fall Risk     Fall Risk Assessment, last 12 mths 6/5/2019   Able to walk? Yes   Fall in past 12 months? No   Fall with injury? -   Number of falls in past 12 months -   Fall Risk Score -         Abuse Screen   Patient is not abused    Review of Systems   A comprehensive review of systems was negative except for that written in the HPI. Physical Examination     Evaluation of Cognitive Function:  Mood/affect:  neutral, happy  Appearance: age appropriate  Family member/caregiver input: none    Blood pressure 120/60, pulse (!) 56, temperature 98.7 °F (37.1 °C), resp. rate 18, weight 210 lb (95.3 kg), SpO2 96 %.   General appearance: alert, cooperative, no distress, appears stated age  Neck: supple, symmetrical, trachea midline, no adenopathy, thyroid: not enlarged, symmetric, no tenderness/mass/nodules, no carotid bruit and no JVD  Lungs: clear to auscultation bilaterally  Heart: regular rate and rhythm, S1, S2 normal, no murmur, click, rub or gallop  Extremities: extremities normal, atraumatic, no cyanosis or edema    Patient Care Team:  Jackie Lau MD as PCP - General (Internal Medicine)  Jackie Lau MD as PCP - Juan Carlos Mathew Provider      Advice/Referrals/Counseling   Education and counseling provided. See below for specific orders  Diagnoses and all orders for this visit:    1. Medicare annual wellness visit, subsequent    2. Screening PSA (prostate specific antigen)  -     PSA W/ REFLX FREE PSA; Future    3. Essential hypertension  Controlled on current regimen. Continue current medications as written in chart. 4. Hodgkin lymphoma of intra-abdominal lymph nodes, unspecified [facility]   asymptomatic. Followed closely by Dr. Leslie Garrido. 5. Cardiomyopathy due to chemotherapy [facility])  Fortunately, asymptomatic and recent echocardiogram was stable. 6. Neuropathy due to chemotherapeutic drug ([facility]  Ongoing neuropathy which is stable and mild. On a B complex. 7. Hyperlipidemia with target LDL less than 130  Unclear control since changing to atorvastatin. Tolerating well. -     LIPID PANEL; Future  -     atorvastatin (LIPITOR) 10 mg tablet; Take 1 Tab by mouth daily. Replaces simvastatin 1/2/20 due to breast tenderness    8. Back muscle spasm  Intermittent back spasms. Refilled cyclobenzaprine which he takes very sparingly. -     cyclobenzaprine (FLEXERIL) 10 mg tablet; Take 1 Tab by mouth three (3) times daily as needed for Muscle Spasm(s). Indications: muscle spasm    9. Anxiety    doing well. Intermittent anxiety spells and takes alprazolam very sparingly. Counseled. No alcohol with alprazolam.   profile was accessed online for [employer] and reviewed by me during this encounter. I did not see evidence of inappropriate or suspicious controlled substance prescription activity.  -     ALPRAZolam (XANAX) 0.25 mg tablet; Take 1 Tab by mouth two (2) times daily as needed for Anxiety. 10. Thyroid carcinoma ([facility]  Recent ultrasound and labs normal per Dr. Gifty Menendez and Dr. Henny Morin. Mel Hendricks Potential medication side effects were discussed with the patient; let me know if any occur.   Return for yearly Annual Wellness Visits

## 2020-07-11 LAB
PSA SERPL-MCNC: 2.1 NG/ML (ref 0–4)
REFLEX CRITERIA: NORMAL

## 2021-05-21 NOTE — PROGRESS NOTES
Subjective   Patient ID: Rachid is a 8 month old male.    Chief Complaint   Patient presents with   • Fever     102 at 2pm today rectal   • Congestion     runny nose   • Rash      1 wk ago      Mom brought son in evaluation after 2 weeks of ongoing congestion, runny nose, with some coughing. Has been sleeping and eating, drinking normally. Plenty of wet diapers. He's also eating solid foods. Initially had fevers x2 days, then yesterday fevers returned, tmax 102 today. Tylenol dose at 3pm today. One circular rash on right shoulder noticed two days ago, wondered if it was bug bites, but this resolved the next day. No further or new rashes. Low energy today. Pt also completed round of amoxicillin (prescribed here) the first week of April, also treated for ear infection.  Child is in  3 days per week, no known infections there.  Mom did have symptoms as well in the last two weeks, and tested negative for covid via rapid test. Mom is vaccinated, second dose May 5.  No recent travel, no siblings.         No past medical history on file.    MEDICATIONS:  No current outpatient medications on file.     No current facility-administered medications for this visit.       ALLERGIES:  ALLERGIES:  No Known Allergies    PAST SURGICAL HISTORY:  Past Surgical History:   Procedure Laterality Date   • No past surgeries         FAMILY HISTORY:  No family history on file.    SOCIAL HISTORY:  Social History     Tobacco Use   • Smoking status: Not on file   Substance Use Topics   • Alcohol use: Not on file   • Drug use: Not on file         Patient's medications, allergies, past medical, surgical, social and family histories were reviewed and updated as appropriate.  Patient's medications, allergies, past medical, surgical, and social history  were reviewed and updated as appropriate.    Review of Systems   Constitutional: Negative for activity change, appetite change and crying.   HENT: Positive for congestion.    Eyes: Negative  This is a Subsequent Medicare Annual Wellness Visit providing Personalized Prevention Plan Services (PPPS) (Performed 12 months after initial AWV and PPPS )    I have reviewed the patient's medical history in detail and updated the computerized patient record. Taking Tumeric and feels joint pains are improved. Hyperlipidemia  Currently he takes simvastatin 20 mg 6 DAYS PER WEEK  ROS: taking medications as instructed, no medication side effects noted  No new myalgias, no joint pains, no weakness  No TIA's, no chest pain on exertion, no dyspnea on exertion, no swelling of ankles. Lab Results   Component Value Date/Time    Cholesterol, total 214 (H) 04/13/2017 08:31 AM    HDL Cholesterol 50 04/13/2017 08:31 AM    LDL, calculated 139 (H) 04/13/2017 08:31 AM    VLDL, calculated 25 04/13/2017 08:31 AM    Triglyceride 124 04/13/2017 08:31 AM       Hypertension- taking 1/2 of lisinopril 20 mg daily  Hypertension ROS: taking medications as instructed, no medication side effects noted, no TIA's, no chest pain on exertion, no dyspnea on exertion, no swelling of ankles     reports that he quit smoking about 43 years ago. His smoking use included Cigarettes. He has a 1.00 pack-year smoking history. He has never used smokeless tobacco.    reports that he drinks about 2.5 oz of alcohol per week    BP Readings from Last 2 Encounters:   04/18/18 122/80   03/27/17 132/83           History     Past Medical History:   Diagnosis Date    Anxiety 1/9/2014    Back muscle spasm 1/9/2014    Congenital cataract     surgery as child    Glaucoma (increased eye pressure)     Dr. 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   Dr. Osiel Blackwood      as child    HX COLONOSCOPY  10/31/2012    polyp - tubular adenoma    HX TONSILLECTOMY         Current Outpatient Prescriptions   Medication Sig    TURMERIC, BULK, by Does Not Apply route.  SHINGRIX, PF, 50 mcg/0.5 mL susr injection 0.5 mL by IntraMUSCular route once for 1 dose. Receive 2nd dose in 2-6 months. For Shingles (Zoster) prevention    lisinopril (PRINIVIL, ZESTRIL) 20 mg tablet TAKE 1 TABLET BY MOUTH EVERY DAY    simvastatin (ZOCOR) 20 mg tablet TAKE 1 TABLET BY MOUTH EVERY EVENING    cyclobenzaprine (FLEXERIL) 10 mg tablet Take 1 Tab by mouth three (3) times daily as needed for Muscle Spasm(s).  ALPRAZolam (XANAX) 0.25 mg tablet Take 1 Tab by mouth two (2) times daily as needed for Anxiety.  loteprednol etabonate (LOTEMAX) 0.5 % ophthalmic suspension Administer 1 Drop to left eye. Once daily    dorzolamide (TRUSOPT) 2 % ophthalmic solution Administer 1 Drop to right eye. Twice daily     No current facility-administered medications for this visit. Allergies   Allergen Reactions    Mercury (Bulk) Rash    Pcn [Penicillins] Rash     As a child; can take Cephalosporins without reaction       Family History   Problem Relation Age of Onset    Cancer Other      aunt    Stroke Mother      carotid dz    Emphysema Father         reports that he quit smoking about 43 years ago. His smoking use included Cigarettes. He has a 1.00 pack-year smoking history. He has never used smokeless tobacco.   reports that he drinks about 2.5 oz of alcohol per week       Depression Risk Factor Screening:       Alcohol Risk Factor Screening: On any occasion during the past 3 months, have you had more than 3 drinks containing alcohol? No    Do you average more than 14 drinks per week? No      Functional Ability and Level of Safety:     Hearing Loss   mild    Activities of Daily Living   Self-care. Requires assistance with: no ADLs    Fall Risk     Fall Risk Assessment, last 12 mths 4/18/2018   Able to walk?  Yes   Fall for discharge.   Respiratory: Negative for wheezing and stridor.    Gastrointestinal: Negative for blood in stool, diarrhea and vomiting.   Skin: Negative for pallor.   Allergic/Immunologic: Negative for food allergies.       Objective   Physical Exam  Vitals and nursing note reviewed.   Constitutional:       General: He is active. He is not in acute distress (pt appears well, alert, responsive and smiling).     Appearance: Normal appearance. He is not toxic-appearing.   HENT:      Head: Normocephalic.      Right Ear: Ear canal and external ear normal. Tympanic membrane is erythematous and bulging.      Left Ear: Ear canal and external ear normal. Tympanic membrane is erythematous.      Nose: Rhinorrhea present.      Mouth/Throat:      Mouth: Mucous membranes are moist.   Cardiovascular:      Rate and Rhythm: Regular rhythm.   Pulmonary:      Effort: No tachypnea or respiratory distress.      Breath sounds: Normal breath sounds. No wheezing.   Lymphadenopathy:      Cervical: No cervical adenopathy.   Skin:     General: Skin is warm and dry.      Turgor: Normal.   Neurological:      Mental Status: He is alert.       Visit Vitals  Pulse 148   Temp 98 °F (36.7 °C) (Tympanic)   Resp 32   Wt 8.22 kg (18 lb 2 oz)   SpO2 98%       Assessment   Problem List Items Addressed This Visit     None      Visit Diagnoses     Fever, unspecified    -  Primary    Relevant Orders    2019 NOVEL CORONAVIRUS (SARS-COV-2)    Nasal congestion        Relevant Orders    2019 NOVEL CORONAVIRUS (SARS-COV-2)          This is a 8 month old year-old male who presents with ongoing congestion and return of fevers, tmax 102 today. Will treat again for otitis media, unilateral. Also rule out covid pcr.     Instructions provided as documented in the AVS.    Thank you for visiting [facility].  Please follow up with your PCP Dr. Pearson.   in past 12 months? Yes   Fall with injury? Yes   Number of falls in past 12 months 1   Fall Risk Score 2         Abuse Screen   Patient is not abused    Review of Systems   A comprehensive review of systems was negative except for that written in the HPI. Physical Examination     Evaluation of Cognitive Function:  Mood/affect:  neutral, happy  Appearance: age appropriate  Family member/caregiver input: none    Blood pressure 122/80, pulse 69, temperature 98.4 °F (36.9 °C), temperature source Oral, resp. rate 16, height 6' 1\" (1.854 m), weight 214 lb 6.4 oz (97.3 kg), SpO2 98 %. General appearance: alert, cooperative, no distress, appears stated age  Neck: supple, symmetrical, trachea midline, no adenopathy, thyroid: not enlarged, symmetric, no tenderness/mass/nodules, no carotid bruit and no JVD  Lungs: clear to auscultation bilaterally  Heart: regular rate and rhythm, S1, S2 normal, no murmur, click, rub or gallop  Extremities: extremities normal, atraumatic, no cyanosis or edema    Patient Care Team:  Lynnette Clark MD as PCP - General (Internal Medicine)      Advice/Referrals/Counseling   Education and counseling provided. See below for specific orders    Assessment/Plan   Diagnoses and all orders for this visit:    1. Medicare annual wellness visit, subsequent  -     LIPID PANEL  -     METABOLIC PANEL, COMPREHENSIVE  -     CBC W/O DIFF  -     PSA W/ REFLX FREE PSA    2. Advanced care planning/counseling discussion    3. Tubulovillous adenoma of colon- due for colonoscopy  -     Der[facility]    4. Hyperlipidemia with target LDL less than 130- Controlled on current regimen. Continue current medications as written in chart  -     LIPID PANEL  -     METABOLIC PANEL, COMPREHENSIVE    5. Essential hypertension- Controlled on current regimen. Continue current medications as written in chart.  -     METABOLIC PANEL, COMPREHENSIVE    6. Anxiety- controlled, rare use of xanax.    profile was accessed online for [employer] and reviewed by me during this encounter. I did not see evidence of inappropriate or suspicious controlled substance prescription activity.  -     ALPRAZolam (XANAX) 0.25 mg tablet; Take 1 Tab by mouth two (2) times daily as needed for Anxiety. Other orders  -     SHINGRIX, PF, 50 mcg/0.5 mL susr injection; 0.5 mL by IntraMUSCular route once for 1 dose. Receive 2nd dose in 2-6 months. For Shingles (Zoster) prevention      Potential medication side effects were discussed with the patient; let me know if any occur.   Return for yearly Annual Wellness Visits

## 2021-07-12 ENCOUNTER — OFFICE VISIT (OUTPATIENT)
Dept: INTERNAL MEDICINE CLINIC | Age: 76
End: 2021-07-12
Payer: MEDICARE

## 2021-07-12 VITALS
HEART RATE: 59 BPM | SYSTOLIC BLOOD PRESSURE: 148 MMHG | WEIGHT: 218.8 LBS | DIASTOLIC BLOOD PRESSURE: 82 MMHG | TEMPERATURE: 97.6 F | OXYGEN SATURATION: 97 % | RESPIRATION RATE: 13 BRPM | BODY MASS INDEX: 28.08 KG/M2 | HEIGHT: 74 IN

## 2021-07-12 DIAGNOSIS — F41.9 ANXIETY: ICD-10-CM

## 2021-07-12 DIAGNOSIS — R35.1 BPH ASSOCIATED WITH NOCTURIA: ICD-10-CM

## 2021-07-12 DIAGNOSIS — I42.7 CARDIOMYOPATHY DUE TO CHEMOTHERAPY (HCC): ICD-10-CM

## 2021-07-12 DIAGNOSIS — T45.1X5A CARDIOMYOPATHY DUE TO CHEMOTHERAPY (HCC): ICD-10-CM

## 2021-07-12 DIAGNOSIS — C81.93 HODGKIN LYMPHOMA OF INTRA-ABDOMINAL LYMPH NODES, UNSPECIFIED HODGKIN LYMPHOMA TYPE (HCC): ICD-10-CM

## 2021-07-12 DIAGNOSIS — T45.1X5A NEUROPATHY DUE TO CHEMOTHERAPEUTIC DRUG (HCC): ICD-10-CM

## 2021-07-12 DIAGNOSIS — I10 ESSENTIAL HYPERTENSION: ICD-10-CM

## 2021-07-12 DIAGNOSIS — E89.0 POST-SURGICAL HYPOTHYROIDISM: ICD-10-CM

## 2021-07-12 DIAGNOSIS — N40.1 BPH ASSOCIATED WITH NOCTURIA: ICD-10-CM

## 2021-07-12 DIAGNOSIS — G62.0 NEUROPATHY DUE TO CHEMOTHERAPEUTIC DRUG (HCC): ICD-10-CM

## 2021-07-12 DIAGNOSIS — E78.5 HYPERLIPIDEMIA WITH TARGET LDL LESS THAN 130: ICD-10-CM

## 2021-07-12 DIAGNOSIS — Z00.00 MEDICARE ANNUAL WELLNESS VISIT, SUBSEQUENT: Primary | ICD-10-CM

## 2021-07-12 DIAGNOSIS — M62.830 BACK MUSCLE SPASM: ICD-10-CM

## 2021-07-12 PROBLEM — E73.9 LACTOSE INTOLERANCE: Status: ACTIVE | Noted: 2020-01-01

## 2021-07-12 LAB
CHOLEST SERPL-MCNC: 168 MG/DL
HDLC SERPL-MCNC: 54 MG/DL
HDLC SERPL: 3.1 {RATIO} (ref 0–5)
LDLC SERPL CALC-MCNC: 86.2 MG/DL (ref 0–100)
TRIGL SERPL-MCNC: 139 MG/DL (ref ?–150)
VLDLC SERPL CALC-MCNC: 27.8 MG/DL

## 2021-07-12 PROCEDURE — G8510 SCR DEP NEG, NO PLAN REQD: HCPCS | Performed by: INTERNAL MEDICINE

## 2021-07-12 PROCEDURE — G0439 PPPS, SUBSEQ VISIT: HCPCS | Performed by: INTERNAL MEDICINE

## 2021-07-12 PROCEDURE — G8536 NO DOC ELDER MAL SCRN: HCPCS | Performed by: INTERNAL MEDICINE

## 2021-07-12 PROCEDURE — 99214 OFFICE O/P EST MOD 30 MIN: CPT | Performed by: INTERNAL MEDICINE

## 2021-07-12 PROCEDURE — 3017F COLORECTAL CA SCREEN DOC REV: CPT | Performed by: INTERNAL MEDICINE

## 2021-07-12 PROCEDURE — G8419 CALC BMI OUT NRM PARAM NOF/U: HCPCS | Performed by: INTERNAL MEDICINE

## 2021-07-12 PROCEDURE — G8754 DIAS BP LESS 90: HCPCS | Performed by: INTERNAL MEDICINE

## 2021-07-12 PROCEDURE — G8753 SYS BP > OR = 140: HCPCS | Performed by: INTERNAL MEDICINE

## 2021-07-12 PROCEDURE — 1101F PT FALLS ASSESS-DOCD LE1/YR: CPT | Performed by: INTERNAL MEDICINE

## 2021-07-12 PROCEDURE — G0463 HOSPITAL OUTPT CLINIC VISIT: HCPCS | Performed by: INTERNAL MEDICINE

## 2021-07-12 PROCEDURE — G8427 DOCREV CUR MEDS BY ELIG CLIN: HCPCS | Performed by: INTERNAL MEDICINE

## 2021-07-12 RX ORDER — TRIAMCINOLONE ACETONIDE 1 MG/G
OINTMENT TOPICAL
COMMUNITY
Start: 2021-06-29

## 2021-07-12 RX ORDER — ATORVASTATIN CALCIUM 10 MG/1
10 TABLET, FILM COATED ORAL DAILY
Qty: 90 TABLET | Refills: 3 | Status: SHIPPED | OUTPATIENT
Start: 2021-07-12 | End: 2022-08-10

## 2021-07-12 RX ORDER — ALPRAZOLAM 0.25 MG/1
0.25 TABLET ORAL
Qty: 21 TABLET | Refills: 0 | Status: SHIPPED | OUTPATIENT
Start: 2021-07-12

## 2021-07-12 RX ORDER — CYCLOBENZAPRINE HCL 10 MG
10 TABLET ORAL
Qty: 30 TABLET | Refills: 1 | Status: SHIPPED | OUTPATIENT
Start: 2021-07-12

## 2021-07-12 NOTE — PROGRESS NOTES
This is a Subsequent Medicare Annual Wellness Visit providing Personalized Prevention Plan Services (PPPS) (Performed 12 months after initial AWV and PPPS )    I have reviewed the patient's medical history in detail and updated the computerized patient record. History of cardiomyopathy. Saw  Dr. Juliana Del Castillo 1/2021. He was taking losartan but stopped and home -140/70-80, so did not resume. BP is high here today. Hodgkin's lymphoma. He is seeing Dr. Jeremie Lamb for follow-up and cbc. Labs done 3/2021 showed normal CBC. History of thyroid carcinoma. Status post thyroidectomy March 2019 with Dr. Alfredo Ma. He sees Dr. Yana Erickson regularly for follow-up. Patient had labs done with Dr. Nirali Ryan     Saw derm for leg rash. Using triamcinolone w improvement    Anxiety. He has intermittent spells of anxiety which are easily relieved with alprazolam 0.25 mg. Was prescribed 21 pills 6/10/19 and still has some leftover. Hyperlipidemia  Currently he takes atorvastatin 10 mg  Gynecomastia has improved since he changed atorvastatin to simvastatin 1/2/20. ROS: taking medications as instructed, no medication side effects noted  No new myalgias, no joint pains, no weakness  No TIA's, no chest pain on exertion, no dyspnea on exertion, no swelling of ankles. Lab Results   Component Value Date/Time    Cholesterol, total 171 07/09/2020 02:46 PM    HDL Cholesterol 58 07/09/2020 02:46 PM    LDL, calculated 81.8 07/09/2020 02:46 PM    VLDL, calculated 31.2 07/09/2020 02:46 PM    Triglyceride 156 (H) 07/09/2020 02:46 PM    CHOL/HDL Ratio 2.9 07/09/2020 02:46 PM             History     Past Medical History:   Diagnosis Date    Anxiety 1/9/2014    Back muscle spasm 1/9/2014    Cardiomyopathy due to chemotherapy [facility]) 2018    Dr. Juliana Del Castillo 7/2020. echo 7/2020.   prior EF 50% per Dr. Nils Valdez cataract     surgery as child    Glaucoma (increased eye pressure)     Dr. Inessa Mackey and Temo Celaya [facility]) 05/2018    Dr. Gonzalez Foley. s/p chemo. intraabdominal    HTN (hypertension)     Hyperlipidemia LDL goal < 130 1/6/2012    Lactose intolerance 2020    Mass of left inguinal region 05/2018    lymphoma possible. Dr. Gonzalez Foley.  Neuropathy due to chemotherapeutic drug [facility])     Post-surgical hypothyroidism 03/2019    Dr. Aubrey Denis    Thyroid carcinoma [facility]) 03/04/2019    Dr. Marlon Mi thyroidectomy. Dr. Aubrey Denis, Dr. Yolanda Barr. 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 Dr. Teja Duke    COLONOSCOPY N/A 5/8/2019    2 cm and 1.5 cm tubular adenomas. COLONOSCOPY performed by Rad Reyna MD at [facility] ENDOSCOPY    HX CATARACT REMOVAL      as child    HX COLONOSCOPY  10/31/2012    polyp - tubular adenoma    HX OTHER SURGICAL      port-a-cath    HX THYROIDECTOMY  03/08/2019    cancer. Dr. Nelli Elise HX TONSILLECTOMY         Current Outpatient Medications   Medication Sig    triamcinolone acetonide (KENALOG) 0.1 % ointment APPLY OINTMENT TWICE DAILY TO BILATERAL SHINS AS NEEDED FOR ITCHING    atorvastatin (LIPITOR) 10 mg tablet Take 1 Tab by mouth daily. Replaces simvastatin 1/2/20 due to breast tenderness    cyclobenzaprine (FLEXERIL) 10 mg tablet Take 1 Tab by mouth three (3) times daily as needed for Muscle Spasm(s). Indications: muscle spasm    ALPRAZolam (XANAX) 0.25 mg tablet Take 1 Tab by mouth two (2) times daily as needed for Anxiety.  vit B Cmplx 3-FA-Vit C-Biotin (NEPHRO SHWETA RX) 1- mg-mg-mcg tablet Take 1 Tab by mouth daily.  levothyroxine (SYNTHROID) 112 mcg tablet Take 1 Tab by mouth Daily (before breakfast).  carvedilol (COREG) 3.125 mg tablet Take 3.125 mg by mouth two (2) times daily (with meals).  loteprednol etabonate (LOTEMAX) 0.5 % ophthalmic suspension Administer 1 Drop to left eye.  Once daily    dorzolamide (TRUSOPT) 2 % ophthalmic solution Administer 1 Drop to right eye. Twice daily     No current facility-administered medications for this visit. Allergies   Allergen Reactions    Mercury (Bulk) Rash    Pcn [Penicillins] Rash     As a child; can take Cephalosporins without reaction       Family History   Problem Relation Age of Onset    Cancer Other         aunt    Stroke Mother         carotid dz    Emphysema Father         reports that he quit smoking about 46 years ago. His smoking use included cigarettes. He has a 1.00 pack-year smoking history. He has never used smokeless tobacco.   reports current alcohol use of about 4.2 standard drinks of alcohol per week. Depression Risk Factor Screening:       Alcohol Risk Factor Screening: On any occasion during the past 3 months, have you had more than 3 drinks containing alcohol? No    Do you average more than 14 drinks per week? No      Functional Ability and Level of Safety:     Hearing Loss   mild    Activities of Daily Living   Self-care. Requires assistance with: no ADLs    Fall Risk     Fall Risk Assessment, last 12 mths 7/12/2021   Able to walk? Yes   Fall in past 12 months? 0   Do you feel unsteady? 0   Are you worried about falling 0   Number of falls in past 12 months -   Fall with injury? -         Abuse Screen   Patient is not abused    Review of Systems   A comprehensive review of systems was negative except for that written in the HPI. Physical Examination     Evaluation of Cognitive Function:  Mood/affect:  neutral, happy  Appearance: age appropriate  Family member/caregiver input: none    Blood pressure (!) 148/82, pulse (!) 59, temperature 97.6 °F (36.4 °C), temperature source Oral, resp. rate 13, height 6' 2\" (1.88 m), weight 218 lb 12.8 oz (99.2 kg), SpO2 97 %.   General appearance: alert, cooperative, no distress, appears stated age  Neck: supple, symmetrical, trachea midline, no adenopathy, thyroid: not enlarged, symmetric, no tenderness/mass/nodules, no carotid bruit and no JVD  Lungs: clear to auscultation bilaterally  Heart: regular rate and rhythm, S1, S2 normal, no murmur, click, rub or gallop  Extremities: extremities normal, atraumatic, no cyanosis or edema    Patient Care Team:  Kilo Tracy MD as PCP - General (Internal Medicine)  Kilo Tracy MD as PCP - [facility]      Advice/Referrals/Counseling   Education and counseling provided. See below for specific orders  Diagnoses and all orders for this visit:  ,  1. Medicare annual wellness visit, subsequent  I recommend COVID-29 vaccine. He say his wife disagrees with it, so he has had to delay getting it. 2. Screening PSA (prostate specific antigen)  -     PSA W/ REFLX FREE PSA; Future    3. Essential hypertension  Based on my history and review of available date, the overall control of this problem borderline controlled. Will refill and continue current medications and monitor. He reports home BP is better. Has losartan to resume if usually > 130/80 or sometimes > 140/90  F/u cardiology. 4. Hodgkin lymphoma of intra-abdominal lymph nodes, unspecified Hodgkin lymphoma type ([facility] [address]   asymptomatic. Followed closely by Dr. Jovany Buckner. 5. Cardiomyopathy due to chemotherapy [facility])  Fortunately, asymptomatic. Resume losartan if BP can tolterate    6. Neuropathy due to chemotherapeutic drug (HCC)  Ongoing neuropathy which is stable and mild. On a B complex. 7. Hyperlipidemia with target LDL less than 130  Unclear control since changing to atorvastatin. Tolerating well. -     LIPID PANEL; Future  -     atorvastatin (LIPITOR) 10 mg tablet; Take 1 Tab by mouth daily. Replaces simvastatin 1/2/20 due to breast tenderness    8. Back muscle spasm  Intermittent back spasms. Refilled cyclobenzaprine which he takes very sparingly. -     cyclobenzaprine (FLEXERIL) 10 mg tablet;  Take 1 Tab by mouth three (3) times daily as needed for Muscle Spasm(s). Indications: muscle spasm    9. Anxiety    doing well. Intermittent anxiety spells and takes alprazolam very sparingly. Counseled. No alcohol with alprazolam.   profile was accessed online for [employer] and reviewed by me during this encounter. I did not see evidence of inappropriate or suspicious controlled substance prescription activity.  -     ALPRAZolam (XANAX) 0.25 mg tablet; Take 1 Tab by mouth two (2) times daily as needed for Anxiety. 10. Thyroid carcinoma ([facility] [address]  Recent ultrasound and labs normal per Dr. Willa Laird and Dr. Suly Velazquez. Bean Prakash Potential medication side effects were discussed with the patient; let me know if any occur.   Return for yearly Annual Wellness Visits

## 2021-07-14 LAB
PSA SERPL-MCNC: 2.3 NG/ML (ref 0–4)
REFLEX CRITERIA: NORMAL

## 2022-03-17 ENCOUNTER — VIRTUAL VISIT (OUTPATIENT)
Dept: INTERNAL MEDICINE CLINIC | Age: 77
End: 2022-03-17
Payer: MEDICARE

## 2022-03-17 DIAGNOSIS — C81.93 HODGKIN LYMPHOMA OF INTRA-ABDOMINAL LYMPH NODES, UNSPECIFIED HODGKIN LYMPHOMA TYPE (HCC): ICD-10-CM

## 2022-03-17 DIAGNOSIS — R05.9 COUGH: Primary | ICD-10-CM

## 2022-03-17 PROCEDURE — 99442 PR PHYS/QHP TELEPHONE EVALUATION 11-20 MIN: CPT | Performed by: INTERNAL MEDICINE

## 2022-03-17 RX ORDER — ZINC GLUCONATE 10 MG
250 LOZENGE ORAL DAILY
COMMUNITY

## 2022-03-17 NOTE — PROGRESS NOTES
Duyen Urrutia (: 1945) is a 77 y.o. male, established patient, here for evaluation of the following chief complaint(s):   Cough (Patient complains of sore throat, cough and headaches that started Tuesday, states yesterday and today experiencing cold chills. Low grade fever, and states his pulse is elevated. Negative covid test. )       ASSESSMENT/PLAN:  Below is the assessment and plan developed based on review of pertinent history, labs, studies, and medications. 1. Cough-48 hours of sore throat and cough and ha with low  Grade temp  Neg rapid covid test but he is unvaccinated and at risk for serious disease given age and hx  I advised urgent care eval  [facility] discussed worry that false neg rapid test in early part of illness is a concern -discussed if has covid he is a good candidate for antivirals  2. Hodgkin lymphoma of intra-abdominal lymph nodes, unspecified Hodgkin lymphoma type ([facility] [address]      No follow-ups on file. SUBJECTIVE/OBJECTIVE:  HPI    Review of Systems     Patient-Reported Systolic (Top): 396  Patient-Reported Diastolic (Bottom): 68  Patient-Reported Pulse: 66  Patient-Reported Temperature: 99.2  Patient-Reported Weight: 210lb       Physical Exam    Doxy system not working so t his was a phone visit    On this date 2022 I have spent 12 minutes reviewing previous notes, test results  with the patient discussing the diagnosis and importance of compliance with the treatment plan as well as documenting on the day of the visit. Duyen Urrutia, was evaluated through a synchronous (real-time) audio encounter. The patient (or guardian if applicable) is aware that this is a billable service, which includes applicable co-pays. Verbal consent to proceed has been obtained.  The visit was conducted pursuant to the emergency declaration under the [facility]5 waiver authority and the Coronavirus Preparedness and Response Supplemental Appropriations Act. Patient identification was verified, and a caregiver was present when appropriate. The patient was located at home in a state where the provider was licensed to provide care. An electronic signature was used to authenticate this note.   -- Clay Whittington MD

## 2022-03-18 PROBLEM — I42.7 CARDIOMYOPATHY DUE TO CHEMOTHERAPY (HCC): Status: ACTIVE | Noted: 2018-01-01

## 2022-03-18 PROBLEM — T45.1X5A CARDIOMYOPATHY DUE TO CHEMOTHERAPY (HCC): Status: ACTIVE | Noted: 2018-01-01

## 2022-03-19 PROBLEM — C73 THYROID CARCINOMA (HCC): Status: ACTIVE | Noted: 2019-03-04

## 2022-03-19 PROBLEM — E89.0 POST-SURGICAL HYPOTHYROIDISM: Status: ACTIVE | Noted: 2019-03-01

## 2022-03-19 PROBLEM — E73.9 LACTOSE INTOLERANCE: Status: ACTIVE | Noted: 2020-01-01

## 2022-07-11 ENCOUNTER — OFFICE VISIT (OUTPATIENT)
Dept: INTERNAL MEDICINE CLINIC | Age: 77
End: 2022-07-11
Payer: MEDICARE

## 2022-07-11 VITALS
TEMPERATURE: 98.4 F | HEART RATE: 68 BPM | DIASTOLIC BLOOD PRESSURE: 80 MMHG | HEIGHT: 74 IN | BODY MASS INDEX: 27.77 KG/M2 | WEIGHT: 216.4 LBS | RESPIRATION RATE: 15 BRPM | OXYGEN SATURATION: 96 % | SYSTOLIC BLOOD PRESSURE: 138 MMHG

## 2022-07-11 DIAGNOSIS — Z00.00 MEDICARE ANNUAL WELLNESS VISIT, SUBSEQUENT: Primary | ICD-10-CM

## 2022-07-11 DIAGNOSIS — N40.1 BPH ASSOCIATED WITH NOCTURIA: ICD-10-CM

## 2022-07-11 DIAGNOSIS — R35.1 BPH ASSOCIATED WITH NOCTURIA: ICD-10-CM

## 2022-07-11 DIAGNOSIS — D12.6 TUBULOVILLOUS ADENOMA OF COLON: ICD-10-CM

## 2022-07-11 DIAGNOSIS — D12.6 ADENOMATOUS POLYP OF COLON, UNSPECIFIED PART OF COLON: ICD-10-CM

## 2022-07-11 DIAGNOSIS — T45.1X5A CARDIOMYOPATHY DUE TO CHEMOTHERAPY (HCC): ICD-10-CM

## 2022-07-11 DIAGNOSIS — E78.5 HYPERLIPIDEMIA WITH TARGET LDL LESS THAN 130: ICD-10-CM

## 2022-07-11 DIAGNOSIS — I42.7 CARDIOMYOPATHY DUE TO CHEMOTHERAPY (HCC): ICD-10-CM

## 2022-07-11 DIAGNOSIS — T45.1X5A NEUROPATHY DUE TO CHEMOTHERAPEUTIC DRUG (HCC): ICD-10-CM

## 2022-07-11 DIAGNOSIS — I10 PRIMARY HYPERTENSION: ICD-10-CM

## 2022-07-11 DIAGNOSIS — G62.0 NEUROPATHY DUE TO CHEMOTHERAPEUTIC DRUG (HCC): ICD-10-CM

## 2022-07-11 PROBLEM — Z28.21 COVID-19 VACCINATION DECLINED: Status: ACTIVE | Noted: 2022-01-01

## 2022-07-11 PROCEDURE — G8417 CALC BMI ABV UP PARAM F/U: HCPCS | Performed by: INTERNAL MEDICINE

## 2022-07-11 PROCEDURE — G8427 DOCREV CUR MEDS BY ELIG CLIN: HCPCS | Performed by: INTERNAL MEDICINE

## 2022-07-11 PROCEDURE — G8536 NO DOC ELDER MAL SCRN: HCPCS | Performed by: INTERNAL MEDICINE

## 2022-07-11 PROCEDURE — G8510 SCR DEP NEG, NO PLAN REQD: HCPCS | Performed by: INTERNAL MEDICINE

## 2022-07-11 PROCEDURE — G8752 SYS BP LESS 140: HCPCS | Performed by: INTERNAL MEDICINE

## 2022-07-11 PROCEDURE — 3017F COLORECTAL CA SCREEN DOC REV: CPT | Performed by: INTERNAL MEDICINE

## 2022-07-11 PROCEDURE — 1101F PT FALLS ASSESS-DOCD LE1/YR: CPT | Performed by: INTERNAL MEDICINE

## 2022-07-11 PROCEDURE — G0439 PPPS, SUBSEQ VISIT: HCPCS | Performed by: INTERNAL MEDICINE

## 2022-07-11 PROCEDURE — 99214 OFFICE O/P EST MOD 30 MIN: CPT | Performed by: INTERNAL MEDICINE

## 2022-07-11 PROCEDURE — G8754 DIAS BP LESS 90: HCPCS | Performed by: INTERNAL MEDICINE

## 2022-07-11 PROCEDURE — G0463 HOSPITAL OUTPT CLINIC VISIT: HCPCS | Performed by: INTERNAL MEDICINE

## 2022-07-11 RX ORDER — LOSARTAN POTASSIUM 25 MG/1
25 TABLET ORAL DAILY
COMMUNITY
Start: 2022-05-11

## 2022-07-12 LAB
ALBUMIN SERPL-MCNC: 4.2 G/DL (ref 3.5–5)
ALBUMIN/GLOB SERPL: 1.4 {RATIO} (ref 1.1–2.2)
ALP SERPL-CCNC: 37 U/L (ref 45–117)
ALT SERPL-CCNC: 24 U/L (ref 12–78)
ANION GAP SERPL CALC-SCNC: 5 MMOL/L (ref 5–15)
AST SERPL-CCNC: 24 U/L (ref 15–37)
BILIRUB SERPL-MCNC: 0.4 MG/DL (ref 0.2–1)
BUN SERPL-MCNC: 25 MG/DL (ref 6–20)
BUN/CREAT SERPL: 22 (ref 12–20)
CALCIUM SERPL-MCNC: 9 MG/DL (ref 8.5–10.1)
CHLORIDE SERPL-SCNC: 103 MMOL/L (ref 97–108)
CHOLEST SERPL-MCNC: 164 MG/DL
CO2 SERPL-SCNC: 30 MMOL/L (ref 21–32)
CREAT SERPL-MCNC: 1.12 MG/DL (ref 0.7–1.3)
ERYTHROCYTE [DISTWIDTH] IN BLOOD BY AUTOMATED COUNT: 13.9 % (ref 11.5–14.5)
GLOBULIN SER CALC-MCNC: 3 G/DL (ref 2–4)
GLUCOSE SERPL-MCNC: 98 MG/DL (ref 65–100)
HCT VFR BLD AUTO: 43.8 % (ref 36.6–50.3)
HDLC SERPL-MCNC: 51 MG/DL
HDLC SERPL: 3.2 {RATIO} (ref 0–5)
HGB BLD-MCNC: 14.4 G/DL (ref 12.1–17)
LDLC SERPL CALC-MCNC: 76 MG/DL (ref 0–100)
MCH RBC QN AUTO: 33.3 PG (ref 26–34)
MCHC RBC AUTO-ENTMCNC: 32.9 G/DL (ref 30–36.5)
MCV RBC AUTO: 101.4 FL (ref 80–99)
NRBC # BLD: 0 K/UL (ref 0–0.01)
NRBC BLD-RTO: 0 PER 100 WBC
PLATELET # BLD AUTO: 202 K/UL (ref 150–400)
PMV BLD AUTO: 11.2 FL (ref 8.9–12.9)
POTASSIUM SERPL-SCNC: 4.3 MMOL/L (ref 3.5–5.1)
PROT SERPL-MCNC: 7.2 G/DL (ref 6.4–8.2)
RBC # BLD AUTO: 4.32 M/UL (ref 4.1–5.7)
SODIUM SERPL-SCNC: 138 MMOL/L (ref 136–145)
TRIGL SERPL-MCNC: 185 MG/DL (ref ?–150)
VLDLC SERPL CALC-MCNC: 37 MG/DL
WBC # BLD AUTO: 6.5 K/UL (ref 4.1–11.1)

## 2022-07-12 NOTE — PROGRESS NOTES
This is a Subsequent Medicare Annual Wellness Visit providing Personalized Prevention Plan Services (PPPS) (Performed 12 months after initial AWV and PPPS )    I have reviewed the patient's medical history in detail and updated the computerized patient record. Hypertension- seeing Dr. Keegan Urrutia. History of cardiomyopathy. Sometimes takes losartan  Hypertension ROS: taking medications as instructed, no medication side effects noted, no TIA's, no chest pain on exertion, no dyspnea on exertion, no swelling of ankles     reports that he quit smoking about 47 years ago. His smoking use included cigarettes. He has a 1.00 pack-year smoking history. He has never used smokeless tobacco.    reports current alcohol use of about 4.2 standard drinks of alcohol per week. BP Readings from Last 2 Encounters:   07/11/22 138/80   07/12/21 (!) 148/82     Hodgkin's lymphoma. He is seeing Dr. Willams Both for follow-up and cbc. History of thyroid carcinoma. Status post thyroidectomy March 2019 with Dr. Yamile Caballero. He sees Dr. Lesley Martinez regularly for follow-up. Patient had labs done with Dr. Diaz Torrez. He has intermittent spells of anxiety which are easily relieved with alprazolam 0.25 mg. Was prescribed 21 pills 6/10/19 and still has some leftover. Hyperlipidemia  Currently he takes atorvastatin 10 mg  Gynecomastia has improved since he changed atorvastatin to simvastatin 1/2/20. ROS: taking medications as instructed, no medication side effects noted  No new myalgias, no joint pains, no weakness  No TIA's, no chest pain on exertion, no dyspnea on exertion, no swelling of ankles.    Lab Results   Component Value Date/Time    Cholesterol, total 168 07/12/2021 02:38 PM    HDL Cholesterol 54 07/12/2021 02:38 PM    LDL, calculated 86.2 07/12/2021 02:38 PM    VLDL, calculated 27.8 07/12/2021 02:38 PM    Triglyceride 139 07/12/2021 02:38 PM    CHOL/HDL Ratio 3.1 07/12/2021 02:38 PM             History     Past Medical History: Diagnosis Date    AK (actinic keratosis)     sees dermatology    Anxiety 1/9/2014    Back muscle spasm 1/9/2014    Cardiomyopathy due to chemotherapy [facility]) 2018    Dr. Zeenat Voss 7/2020. echo 7/2020. prior EF 50% per Dr. Donato Husbands cataract     surgery as child    COVID-19 vaccination declined 2022    Glaucoma (increased eye pressure)     Dr. Miguel A Burns and Sarahi Flavin [facility]) 05/2018    Dr. Thi Mckenna. s/p chemo. intraabdominal    HTN (hypertension)     Hyperlipidemia LDL goal < 130 1/6/2012    Lactose intolerance 2020    Mass of left inguinal region 05/2018    lymphoma possible. Dr. Thi Mckenna.  Neuropathy due to chemotherapeutic drug ([address]     [facility]    Post-surgical hypothyroidism 03/2019    Dr. Julius Caballero    Thyroid carcinoma [facility]) 03/04/2019    Dr. Yady Farrell thyroidectomy. Dr. Julius Caballero, Dr. Saldaña. 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 Frankie Byrd. TV adenoma, repeat 10/2012, 5/2019 (2 large polyps)       Past Surgical History:   Procedure Laterality Date    COLONOSCOPY  7/22/09    1.1 cm tubulovillous adenoma, tublar adenoma. Dr. Adrienne Smith    COLONOSCOPY N/A 5/8/2019    2 cm and 1.5 cm tubular adenomas. COL[facility] by [facility]ha Thorpe [facility] at [facility]OSCOPY    HX CATARACT REMOVAL      as child    HX COLONOSCOPY  10/31/2012    polyp - tubular adenoma    HX OTHER SURGICAL      port-a-cath    HX THYROIDECTOMY  03/08/2019    cancer. Dr. Tobi Romeo HX TONSILLECTOMY         Current Outpatient Medications   Medication Sig    losartan (COZAAR) 25 mg tablet Take 25 mg by mouth daily.  cholecalciferol, vitamin D3, (VITAMIN D3 PO) Take  by mouth daily. 5,000 units    magnesium 250 mg tab Take 250 mg by mouth daily.     triamcinolone acetonide (KENALOG) 0.1 % ointment APPLY OINTMENT TWICE DAILY TO BILATERAL SHINS AS NEEDED FOR ITCHING    cyclobenzaprine (FLEXERIL) 10 mg tablet Take 1 Tablet by mouth three (3) times daily as needed for Muscle Spasm(s). Indications: muscle spasm    atorvastatin (LIPITOR) 10 mg tablet Take 1 Tablet by mouth daily. Replaces simvastatin 1/2/20 due to breast tenderness    ALPRAZolam (XANAX) 0.25 mg tablet Take 1 Tablet by mouth two (2) times daily as needed for Anxiety.  vit B Cmplx 3-FA-Vit C-Biotin (NEPHRO SHWETA RX) 1- mg-mg-mcg tablet Take 1 Tab by mouth daily.  levothyroxine (SYNTHROID) 112 mcg tablet Take 1 Tab by mouth Daily (before breakfast).  carvedilol (COREG) 3.125 mg tablet Take 3.125 mg by mouth two (2) times daily (with meals).  loteprednol etabonate (LOTEMAX) 0.5 % ophthalmic suspension Administer 1 Drop to left eye. Once daily    dorzolamide (TRUSOPT) 2 % ophthalmic solution Administer 1 Drop to right eye. Twice daily     No current facility-administered medications for this visit. Allergies   Allergen Reactions    Mercury (Bulk) Rash    Pcn [Penicillins] Rash     As a child; can take Cephalosporins without reaction       Family History   Problem Relation Age of Onset    Cancer Other         aunt    Stroke Mother         carotid dz    Emphysema Father         reports that he quit smoking about 47 years ago. His smoking use included cigarettes. He has a 1.00 pack-year smoking history. He has never used smokeless tobacco.   reports current alcohol use of about 4.2 standard drinks of alcohol per week. Depression Risk Factor Screening:       Alcohol Risk Factor Screening: On any occasion during the past 3 months, have you had more than 3 drinks containing alcohol? No    Do you average more than 14 drinks per week? No      Functional Ability and Level of Safety:     Hearing Loss   mild    Activities of Daily Living   Self-care. Requires assistance with: no ADLs    Fall Risk     Fall Risk Assessment, last 12 mths 7/11/2022   Able to walk? Yes   Fall in past 12 months? 0   Do you feel unsteady?  0   Are you worried about falling 0 Number of falls in past 12 months -   Fall with injury? -         Abuse Screen   Patient is not abused    Review of Systems   A comprehensive review of systems was negative except for that written in the HPI. Physical Examination     Evaluation of Cognitive Function:  Mood/affect:  neutral, happy  Appearance: age appropriate  Family member/caregiver input: none    Blood pressure 138/80, pulse 68, temperature 98.4 °F (36.9 °C), temperature source Oral, resp. rate 15, height 6' 2\" (1.88 m), weight 216 lb 6.4 oz (98.2 kg), SpO2 96 %. General appearance: alert, cooperative, no distress, appears stated age  Neck: supple, symmetrical, trachea midline, no adenopathy, thyroid: not enlarged, symmetric, no tenderness/mass/nodules, no carotid bruit and no JVD  Lungs: clear to auscultation bilaterally  Heart: regular rate and rhythm, S1, S2 normal, no murmur, click, rub or gallop  Extremities: extremities normal, atraumatic, no cyanosis or edema    Patient Care Team:  Carmen Neumann MD as PCP - General (Internal Medicine Physician)  Carmen Neumann MD as PCP - REHABILITATION [facility]      Advice/Referrals/Counseling   Education and counseling provided. See below for specific orders  Diagnoses and all orders for this visit:  ,  1. Medicare annual wellness visit, subsequent  I recommend COVID-19 vaccine. He and wife are reluctant  ,ACP reviewed. Primary medical decision maker reviewed with patient and updated in chart. 2. Screening PSA (prostate specific antigen)  -     PSA W/ REFLX FREE PSA; Future    3. Essential hypertension  This condition is borderine controlled by medication therapy with losartan per [facility]ilogy. He is not taking losartan daily! 4. Hodgkin lymphoma of intra-abdominal lymph nodes, unspecified Hodgkin lymphoma type ([facility] [address]   asymptomatic. Followed closely by Dr. Therese Lester.   This condition appears to be stable and is being evaluated and managed by his specialist.   No acute findings today warrant change in management plan. 5. Cardiomyopathy due to chemotherapy [facility])  This condition is controlled by medication therapy with coreg, losartan. Refilled medications. 6. Neuropathy due to chemotherapeutic drug ([address]  Ongoing neuropathy which is stable and mild. On a B complex. 7. Hyperlipidemia with target LDL less than 130  Unclear control since changing to atorvastatin. Tolerating well. -     LIPID PANEL; Future  -     atorvastatin (LIPITOR) 10 mg tablet; Take 1 Tab by mouth daily. Replaces simvastatin 1/2/20 due to breast tenderness    8. Back muscle spasm  Intermittent back spasms. Refilled cyclobenzaprine which he takes very sparingly. -     cyclobenzaprine (FLEXERIL) 10 mg tablet; Take 1 Tab by mouth three (3) times daily as needed for Muscle Spasm(s). Indications: muscle spasm    9. Anxiety    doing well. Intermittent anxiety spells and takes alprazolam very sparingly. Counseled. No alcohol with alprazolam.   profile was accessed online for [employer] and reviewed by me during this encounter. I did not see evidence of inappropriate or suspicious controlled substance prescription activity.  -     ALPRAZolam (XANAX) 0.25 mg tablet; Take 1 Tab by mouth two (2) times daily as needed for Anxiety. 10. Thyroid carcinoma ([facility] [address]  Recent ultrasound and labs normal per Dr. Cara Matthews and Dr. Lady Kurtz. 11.  Refer for colonoscopy. Orders Placed This Encounter    LIPID PANEL    METABOLIC PANEL, COMPREHENSIVE    CBC W/O DIFF    PSA W/ REFLX FREE PSA    [facility]    losartan (COZAAR) 25 mg tablet           Potential medication side effects were discussed with the patient; let me know if any occur.   Return for yearly Annual Wellness Visits

## 2022-07-13 LAB
PSA SERPL-MCNC: 3.1 NG/ML (ref 0–4)
REFLEX CRITERIA: NORMAL

## 2022-08-06 DIAGNOSIS — E78.5 HYPERLIPIDEMIA WITH TARGET LDL LESS THAN 130: ICD-10-CM

## 2022-08-10 RX ORDER — ATORVASTATIN CALCIUM 10 MG/1
TABLET, FILM COATED ORAL
Qty: 90 TABLET | Refills: 0 | Status: SHIPPED | OUTPATIENT
Start: 2022-08-10

## 2022-11-10 DIAGNOSIS — E78.5 HYPERLIPIDEMIA WITH TARGET LDL LESS THAN 130: ICD-10-CM

## 2022-11-10 RX ORDER — ATORVASTATIN CALCIUM 10 MG/1
TABLET, FILM COATED ORAL
Qty: 90 TABLET | Refills: 0 | Status: SHIPPED | OUTPATIENT
Start: 2022-11-10

## 2023-05-23 DIAGNOSIS — E78.5 HYPERLIPIDEMIA, UNSPECIFIED: ICD-10-CM

## 2023-05-23 RX ORDER — ATORVASTATIN CALCIUM 10 MG/1
TABLET, FILM COATED ORAL
Qty: 90 TABLET | Refills: 1 | Status: SHIPPED | OUTPATIENT
Start: 2023-05-23

## 2023-08-07 ENCOUNTER — OFFICE VISIT (OUTPATIENT)
Age: 78
End: 2023-08-07
Payer: MEDICARE

## 2023-08-07 VITALS
RESPIRATION RATE: 17 BRPM | HEIGHT: 74 IN | WEIGHT: 222.2 LBS | SYSTOLIC BLOOD PRESSURE: 138 MMHG | OXYGEN SATURATION: 96 % | BODY MASS INDEX: 28.52 KG/M2 | DIASTOLIC BLOOD PRESSURE: 76 MMHG | TEMPERATURE: 98.4 F | HEART RATE: 67 BPM

## 2023-08-07 DIAGNOSIS — Z12.5 SCREENING FOR PROSTATE CANCER: ICD-10-CM

## 2023-08-07 DIAGNOSIS — G62.0 DRUG-INDUCED POLYNEUROPATHY (HCC): ICD-10-CM

## 2023-08-07 DIAGNOSIS — E78.5 HYPERLIPIDEMIA WITH TARGET LDL LESS THAN 130: ICD-10-CM

## 2023-08-07 DIAGNOSIS — F41.9 ANXIETY: ICD-10-CM

## 2023-08-07 DIAGNOSIS — Z00.00 MEDICARE ANNUAL WELLNESS VISIT, SUBSEQUENT: Primary | ICD-10-CM

## 2023-08-07 DIAGNOSIS — I10 PRIMARY HYPERTENSION: ICD-10-CM

## 2023-08-07 DIAGNOSIS — K42.9 UMBILICAL HERNIA WITHOUT OBSTRUCTION AND WITHOUT GANGRENE: ICD-10-CM

## 2023-08-07 DIAGNOSIS — D12.6 TUBULOVILLOUS ADENOMA OF COLON: ICD-10-CM

## 2023-08-07 DIAGNOSIS — I42.7 CARDIOMYOPATHY DUE TO DRUG AND EXTERNAL AGENT (HCC): ICD-10-CM

## 2023-08-07 DIAGNOSIS — M62.830 BACK MUSCLE SPASM: ICD-10-CM

## 2023-08-07 DIAGNOSIS — C91.10 CLL (CHRONIC LYMPHOCYTIC LEUKEMIA) (HCC): ICD-10-CM

## 2023-08-07 DIAGNOSIS — Z28.21 COVID-19 VACCINATION DECLINED: ICD-10-CM

## 2023-08-07 DIAGNOSIS — E89.0 POST-SURGICAL HYPOTHYROIDISM: ICD-10-CM

## 2023-08-07 PROBLEM — C73 THYROID CARCINOMA (HCC): Status: RESOLVED | Noted: 2019-03-04 | Resolved: 2023-08-07

## 2023-08-07 PROCEDURE — G8419 CALC BMI OUT NRM PARAM NOF/U: HCPCS | Performed by: INTERNAL MEDICINE

## 2023-08-07 PROCEDURE — 1123F ACP DISCUSS/DSCN MKR DOCD: CPT | Performed by: INTERNAL MEDICINE

## 2023-08-07 PROCEDURE — G0439 PPPS, SUBSEQ VISIT: HCPCS | Performed by: INTERNAL MEDICINE

## 2023-08-07 PROCEDURE — 1036F TOBACCO NON-USER: CPT | Performed by: INTERNAL MEDICINE

## 2023-08-07 PROCEDURE — G8427 DOCREV CUR MEDS BY ELIG CLIN: HCPCS | Performed by: INTERNAL MEDICINE

## 2023-08-07 PROCEDURE — 99214 OFFICE O/P EST MOD 30 MIN: CPT | Performed by: INTERNAL MEDICINE

## 2023-08-07 PROCEDURE — 3075F SYST BP GE 130 - 139MM HG: CPT | Performed by: INTERNAL MEDICINE

## 2023-08-07 PROCEDURE — 3078F DIAST BP <80 MM HG: CPT | Performed by: INTERNAL MEDICINE

## 2023-08-07 RX ORDER — ATORVASTATIN CALCIUM 10 MG/1
10 TABLET, FILM COATED ORAL DAILY
Qty: 90 TABLET | Refills: 3 | Status: SHIPPED | OUTPATIENT
Start: 2023-08-07

## 2023-08-07 RX ORDER — CYCLOBENZAPRINE HCL 10 MG
10 TABLET ORAL 3 TIMES DAILY PRN
Qty: 21 TABLET | Refills: 0 | Status: SHIPPED | OUTPATIENT
Start: 2023-08-07

## 2023-08-07 RX ORDER — ALPRAZOLAM 0.25 MG/1
0.25 TABLET ORAL 3 TIMES DAILY PRN
Qty: 21 TABLET | Refills: 0 | Status: SHIPPED | OUTPATIENT
Start: 2023-08-07 | End: 2023-08-14

## 2023-08-07 RX ORDER — LEVOTHYROXINE SODIUM 0.1 MG/1
TABLET ORAL
COMMUNITY
Start: 2023-05-23 | End: 2023-08-07

## 2023-08-07 RX ORDER — LOSARTAN POTASSIUM 25 MG/1
25 TABLET ORAL DAILY
Qty: 90 TABLET | Refills: 3
Start: 2023-08-07

## 2023-08-07 RX ORDER — LEVOTHYROXINE SODIUM 0.1 MG/1
TABLET ORAL
Qty: 30 TABLET | Refills: 5
Start: 2023-08-07

## 2023-08-07 SDOH — ECONOMIC STABILITY: FOOD INSECURITY: WITHIN THE PAST 12 MONTHS, THE FOOD YOU BOUGHT JUST DIDN'T LAST AND YOU DIDN'T HAVE MONEY TO GET MORE.: NEVER TRUE

## 2023-08-07 SDOH — ECONOMIC STABILITY: HOUSING INSECURITY
IN THE LAST 12 MONTHS, WAS THERE A TIME WHEN YOU DID NOT HAVE A STEADY PLACE TO SLEEP OR SLEPT IN A SHELTER (INCLUDING NOW)?: NO

## 2023-08-07 SDOH — ECONOMIC STABILITY: INCOME INSECURITY: HOW HARD IS IT FOR YOU TO PAY FOR THE VERY BASICS LIKE FOOD, HOUSING, MEDICAL CARE, AND HEATING?: NOT HARD AT ALL

## 2023-08-07 SDOH — ECONOMIC STABILITY: FOOD INSECURITY: WITHIN THE PAST 12 MONTHS, YOU WORRIED THAT YOUR FOOD WOULD RUN OUT BEFORE YOU GOT MONEY TO BUY MORE.: NEVER TRUE

## 2023-08-07 ASSESSMENT — PATIENT HEALTH QUESTIONNAIRE - PHQ9
2. FEELING DOWN, DEPRESSED OR HOPELESS: 0
SUM OF ALL RESPONSES TO PHQ QUESTIONS 1-9: 0
SUM OF ALL RESPONSES TO PHQ9 QUESTIONS 1 & 2: 0
SUM OF ALL RESPONSES TO PHQ QUESTIONS 1-9: 0
1. LITTLE INTEREST OR PLEASURE IN DOING THINGS: 0

## 2023-08-07 ASSESSMENT — LIFESTYLE VARIABLES
HOW OFTEN DO YOU HAVE A DRINK CONTAINING ALCOHOL: MONTHLY OR LESS
HOW MANY STANDARD DRINKS CONTAINING ALCOHOL DO YOU HAVE ON A TYPICAL DAY: 1 OR 2

## 2023-08-07 NOTE — PROGRESS NOTES
loteprednol (LOTEMAX) 0.5 % ophthalmic suspension Apply 1 drop to eye Yes Ar Automatic Reconciliation   triamcinolone (KENALOG) 0.1 % ointment APPLY OINTMENT TWICE DAILY TO BILATERAL SHINS AS NEEDED FOR ITCHING Yes Ar Automatic Reconciliation       CareTeam (Including outside providers/suppliers regularly involved in providing care):   Patient Care Team:  Keturah Locke MD as PCP - MD Criss as PCP - [facility]     Reviewed and updated this visit:  Tobacco  Allergies  Problems  Med Hx  Surg Hx  Soc Hx  Fam Hx

## 2023-08-08 LAB
CHOLEST SERPL-MCNC: 161 MG/DL
HDLC SERPL-MCNC: 51 MG/DL
HDLC SERPL: 3.2 (ref 0–5)
LDLC SERPL CALC-MCNC: 62.8 MG/DL (ref 0–100)
PSA SERPL-MCNC: 4.1 NG/ML (ref 0.01–4)
TRIGL SERPL-MCNC: 236 MG/DL
VLDLC SERPL CALC-MCNC: 47.2 MG/DL

## 2023-08-09 LAB
ALBUMIN SERPL-MCNC: 4 G/DL (ref 3.5–5)
ALBUMIN/GLOB SERPL: 1.1 (ref 1.1–2.2)
ALP SERPL-CCNC: 42 U/L (ref 45–117)
ALT SERPL-CCNC: 32 U/L (ref 12–78)
ANION GAP SERPL CALC-SCNC: 5 MMOL/L (ref 5–15)
AST SERPL-CCNC: 21 U/L (ref 15–37)
BILIRUB SERPL-MCNC: 0.4 MG/DL (ref 0.2–1)
BUN SERPL-MCNC: 21 MG/DL (ref 6–20)
BUN/CREAT SERPL: 16 (ref 12–20)
CALCIUM SERPL-MCNC: 9 MG/DL (ref 8.5–10.1)
CHLORIDE SERPL-SCNC: 104 MMOL/L (ref 97–108)
CO2 SERPL-SCNC: 30 MMOL/L (ref 21–32)
CREAT SERPL-MCNC: 1.32 MG/DL (ref 0.7–1.3)
GLOBULIN SER CALC-MCNC: 3.8 G/DL (ref 2–4)
GLUCOSE SERPL-MCNC: 94 MG/DL (ref 65–100)
POTASSIUM SERPL-SCNC: 4.1 MMOL/L (ref 3.5–5.1)
PROT SERPL-MCNC: 7.8 G/DL (ref 6.4–8.2)
SODIUM SERPL-SCNC: 139 MMOL/L (ref 136–145)

## 2023-08-21 ENCOUNTER — TELEPHONE (OUTPATIENT)
Age: 78
End: 2023-08-21

## 2023-08-21 NOTE — TELEPHONE ENCOUNTER
Spoke with patient and he reports that he has viewed Dr. Noriega  comments. He states understanding and has scheduled his 6 month follow up for 2/13/23 at 0800 AM. Grateful for the call.

## 2024-10-01 LAB — LEFT VENTRICULAR EJECTION FRACTION, EXTERNAL: 55

## 2024-10-20 DIAGNOSIS — E78.5 HYPERLIPIDEMIA WITH TARGET LDL LESS THAN 130: ICD-10-CM

## 2024-10-22 RX ORDER — ATORVASTATIN CALCIUM 10 MG/1
10 TABLET, FILM COATED ORAL DAILY
Qty: 90 TABLET | Refills: 1 | Status: SHIPPED | OUTPATIENT
Start: 2024-10-22

## 2024-11-22 ENCOUNTER — OFFICE VISIT (OUTPATIENT)
Facility: CLINIC | Age: 79
End: 2024-11-22

## 2024-11-22 VITALS
SYSTOLIC BLOOD PRESSURE: 138 MMHG | TEMPERATURE: 98.1 F | RESPIRATION RATE: 18 BRPM | WEIGHT: 220.8 LBS | OXYGEN SATURATION: 96 % | BODY MASS INDEX: 28.34 KG/M2 | DIASTOLIC BLOOD PRESSURE: 82 MMHG | HEIGHT: 74 IN | HEART RATE: 58 BPM

## 2024-11-22 DIAGNOSIS — E78.5 DYSLIPIDEMIA: ICD-10-CM

## 2024-11-22 DIAGNOSIS — I10 PRIMARY HYPERTENSION: ICD-10-CM

## 2024-11-22 DIAGNOSIS — C91.10 CLL (CHRONIC LYMPHOCYTIC LEUKEMIA) (HCC): ICD-10-CM

## 2024-11-22 DIAGNOSIS — I42.7 CARDIOMYOPATHY DUE TO DRUG AND EXTERNAL AGENT (HCC): ICD-10-CM

## 2024-11-22 DIAGNOSIS — C81.73 OTHER CLASSICAL HODGKIN LYMPHOMA OF INTRA-ABDOMINAL LYMPH NODES (HCC): ICD-10-CM

## 2024-11-22 DIAGNOSIS — Z00.00 MEDICARE ANNUAL WELLNESS VISIT, SUBSEQUENT: Primary | ICD-10-CM

## 2024-11-22 DIAGNOSIS — F41.9 ANXIETY: ICD-10-CM

## 2024-11-22 DIAGNOSIS — Z28.21 COVID-19 VACCINATION DECLINED: ICD-10-CM

## 2024-11-22 DIAGNOSIS — G62.0 DRUG-INDUCED POLYNEUROPATHY (HCC): ICD-10-CM

## 2024-11-22 PROBLEM — I50.9 CONGESTIVE HEART FAILURE (HCC): Status: ACTIVE | Noted: 2024-11-22

## 2024-11-22 PROBLEM — I50.9 CONGESTIVE HEART FAILURE (HCC): Status: RESOLVED | Noted: 2024-11-22 | Resolved: 2024-11-22

## 2024-11-22 RX ORDER — ALPRAZOLAM 0.25 MG/1
0.25 TABLET ORAL NIGHTLY PRN
Qty: 21 TABLET | Refills: 0 | Status: SHIPPED | OUTPATIENT
Start: 2024-11-22 | End: 2024-12-22

## 2024-11-22 RX ORDER — ERYTHROMYCIN 5 MG/G
OINTMENT OPHTHALMIC
COMMUNITY
Start: 2024-10-29

## 2024-11-22 RX ORDER — TAMSULOSIN HYDROCHLORIDE 0.4 MG/1
0.4 CAPSULE ORAL NIGHTLY
COMMUNITY

## 2024-11-22 SDOH — ECONOMIC STABILITY: FOOD INSECURITY: WITHIN THE PAST 12 MONTHS, YOU WORRIED THAT YOUR FOOD WOULD RUN OUT BEFORE YOU GOT MONEY TO BUY MORE.: NEVER TRUE

## 2024-11-22 SDOH — ECONOMIC STABILITY: FOOD INSECURITY: WITHIN THE PAST 12 MONTHS, THE FOOD YOU BOUGHT JUST DIDN'T LAST AND YOU DIDN'T HAVE MONEY TO GET MORE.: NEVER TRUE

## 2024-11-22 SDOH — ECONOMIC STABILITY: INCOME INSECURITY: HOW HARD IS IT FOR YOU TO PAY FOR THE VERY BASICS LIKE FOOD, HOUSING, MEDICAL CARE, AND HEATING?: NOT HARD AT ALL

## 2024-11-22 ASSESSMENT — PATIENT HEALTH QUESTIONNAIRE - PHQ9
SUM OF ALL RESPONSES TO PHQ QUESTIONS 1-9: 0
SUM OF ALL RESPONSES TO PHQ9 QUESTIONS 1 & 2: 0
SUM OF ALL RESPONSES TO PHQ QUESTIONS 1-9: 0
SUM OF ALL RESPONSES TO PHQ QUESTIONS 1-9: 0
2. FEELING DOWN, DEPRESSED OR HOPELESS: NOT AT ALL
1. LITTLE INTEREST OR PLEASURE IN DOING THINGS: NOT AT ALL
SUM OF ALL RESPONSES TO PHQ QUESTIONS 1-9: 0

## 2024-11-22 ASSESSMENT — LIFESTYLE VARIABLES
HOW MANY STANDARD DRINKS CONTAINING ALCOHOL DO YOU HAVE ON A TYPICAL DAY: PATIENT DOES NOT DRINK
HOW OFTEN DO YOU HAVE A DRINK CONTAINING ALCOHOL: NEVER

## 2024-11-22 NOTE — PATIENT INSTRUCTIONS
attack. These may include:    Chest pain or pressure, or a strange feeling in the chest.     Sweating.     Shortness of breath.     Pain, pressure, or a strange feeling in the back, neck, jaw, or upper belly or in one or both shoulders or arms.     Lightheadedness or sudden weakness.     A fast or irregular heartbeat.   After you call 911, the  may tell you to chew 1 adult-strength or 2 to 4 low-dose aspirin. Wait for an ambulance. Do not try to drive yourself.  Watch closely for changes in your health, and be sure to contact your doctor if you have any problems.  Where can you learn more?  Go to https://www.[employer].net/patientEd and enter F075 to learn more about \"A Healthy Heart: Care Instructions.\"  Current as of: June 24, 2023  Content Version: 14.2  © 2024 [employer].   Care instructions adapted under license by [employer]. If you have questions about a medical condition or this instruction, always ask your healthcare professional. [employer] disclaims any warranty or liability for your use of this information.      Personalized Preventive Plan for Adam Garcia - 11/22/2024  Medicare offers a range of preventive health benefits. Some of the tests and screenings are paid in full while other may be subject to a deductible, co-insurance, and/or copay.    Some of these benefits include a comprehensive review of your medical history including lifestyle, illnesses that may run in your family, and various assessments and screenings as appropriate.    After reviewing your medical record and screening and assessments performed today your provider may have ordered immunizations, labs, imaging, and/or referrals for you.  A list of these orders (if applicable) as well as your Preventive Care list are included within your After Visit Summary for your review.    Other Preventive Recommendations:    A preventive eye exam performed by an eye specialist is recommended every 1-2 years

## 2024-11-22 NOTE — PROGRESS NOTES
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 Moreno.   No acute findings today warrant change in management plan.      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 Michael.   No acute findings today warrant change in management plan.      Dyslipidemia  -     Lipid Panel; Future  Based on my history and available data, the overall control of this problem is unclear.  Will adjust medications and plan of care based on further evaluation.       Primary hypertension  This condition is controlled on current medication regimen as written in medication list.  Medications refilled    Anxiety  This condition is controlled on current medication regimen as written in medication list.  Medications refilled.  Refilled xanax.  Taking prn  -     ALPRAZolam (XANAX) 0.25 MG tablet; Take 1 tablet by mouth nightly as needed for Sleep or Anxiety for up to 30 days. Max Daily Amount: 0.25 mg, Disp-21 tablet, R-0Normal    Recommendations for Preventive Services Due: see orders and patient instructions/AVS.  Recommended screening schedule for the next 5-10 years is provided to the patient in written form: see Patient Instructions/AVS.     No follow-ups on file.     Subjective   The following acute and/or chronic problems were also addressed today:    Dx w influenza 10/6/24    Elevated PSA.  Saw urology Dr. Cordero.  MRI done w no lesions of concern.   Started tamsulosin w good resuts.    Last PSA 5/2024 < 4    Seeing Oncology for

## 2024-11-22 NOTE — PROGRESS NOTES
\"Have you been to the ER, urgent care clinic since your last visit?  Hospitalized since your last visit?\"    NO    “Have you seen or consulted any other health care providers outside our system since your last visit?”    YES - When: approximately 2 months ago.  Where and Why: [facility] - Dr. Nestor Cordero.

## 2024-11-23 LAB
CHOLEST SERPL-MCNC: 156 MG/DL
HDLC SERPL-MCNC: 61 MG/DL
HDLC SERPL: 2.6 (ref 0–5)
LDLC SERPL CALC-MCNC: 77.8 MG/DL (ref 0–100)
TRIGL SERPL-MCNC: 86 MG/DL
VLDLC SERPL CALC-MCNC: 17.2 MG/DL

## 2025-04-22 DIAGNOSIS — E78.5 HYPERLIPIDEMIA WITH TARGET LDL LESS THAN 130: ICD-10-CM

## 2025-04-22 RX ORDER — ATORVASTATIN CALCIUM 10 MG/1
10 TABLET, FILM COATED ORAL DAILY
Qty: 90 TABLET | Refills: 1 | Status: SHIPPED | OUTPATIENT
Start: 2025-04-22

## 2025-06-19 DIAGNOSIS — I10 PRIMARY HYPERTENSION: ICD-10-CM

## 2025-06-19 RX ORDER — LOSARTAN POTASSIUM 25 MG/1
25 TABLET ORAL DAILY
Qty: 30 TABLET | Refills: 0 | Status: SHIPPED | OUTPATIENT
Start: 2025-06-19

## 2025-06-19 NOTE — TELEPHONE ENCOUNTER
Pt called requesting losartan bc he neglected to take any with him on vacation as he only takes this when his bp gets over 130 which it is now.  Pt requested that script be sent to [facility] in [facility].  Nurse pended medication & changed pharmacy; message will be forwarded to PCP for approval. Pt verbalized appreciation of assistance.

## 2025-07-11 DIAGNOSIS — I10 PRIMARY HYPERTENSION: ICD-10-CM

## 2025-07-11 RX ORDER — LOSARTAN POTASSIUM 25 MG/1
25 TABLET ORAL DAILY
Qty: 90 TABLET | Refills: 1 | Status: SHIPPED | OUTPATIENT
Start: 2025-07-11

## (undated) DEVICE — Device: Brand: MEDICAL ACTION INDUSTRIES

## (undated) DEVICE — CONNECTOR TBNG AUX H2O JET DISP FOR OLY 160/180 SER

## (undated) DEVICE — SET ADMIN 16ML TBNG L100IN 2 Y INJ SITE IV PIGGY BK DISP

## (undated) DEVICE — SOLIDIFIER FLUID 3000 CC ABSORB

## (undated) DEVICE — KENDALL RADIOLUCENT FOAM MONITORING ELECTRODE -RECTANGULAR SHAPE: Brand: KENDALL

## (undated) DEVICE — SYRINGE MED 20ML STD CLR PLAS LUERLOCK TIP N CTRL DISP

## (undated) DEVICE — CATH IV AUTOGRD BC BLU 22GA 25 -- INSYTE

## (undated) DEVICE — Z DISCONTINUED NO SUB IDED SET EXTN W/ 4 W STPCOCK M SPIN LOK 36IN

## (undated) DEVICE — REM POLYHESIVE ADULT PATIENT RETURN ELECTRODE: Brand: VALLEYLAB

## (undated) DEVICE — SYR 50ML SLIP TIP NSAF LF STRL --

## (undated) DEVICE — BAG BELONG PT PERS CLEAR HANDL

## (undated) DEVICE — QUILTED PREMIUM COMFORT UNDERPAD,EXTRA HEAVY: Brand: WINGS

## (undated) DEVICE — NEEDLE HYPO 18GA L1.5IN PNK S STL HUB POLYPR SHLD REG BVL

## (undated) DEVICE — Device

## (undated) DEVICE — Z DISCONTINUED USE 2751540 TUBING IRRIG L10IN DISP PMP ENDOGATOR

## (undated) DEVICE — CONTAINER SPEC 20 ML LID NEUT BUFF FORMALIN 10 % POLYPR STS

## (undated) DEVICE — WRISTBAND ID AD W1XL11.5IN RED POLY ALRG PREPRINTED PERM

## (undated) DEVICE — CANN NASAL O2 CAPNOGRAPHY AD -- FILTERLINE

## (undated) DEVICE — TRAP SURG QUAD PARABOLA SLOT DSGN SFTY SCRN TRAPEASE

## (undated) DEVICE — AIRLIFE™ U/CONNECT-IT OXYGEN TUBING 7 FEET (2.1 M) CRUSH-RESISTANT OXYGEN TUBING, VINYL TIPPED: Brand: AIRLIFE™

## (undated) DEVICE — NEONATAL-ADULT SPO2 SENSOR: Brand: NELLCOR

## (undated) DEVICE — CLIP MED L235CM L2.8MM 11MM OPN HEMSTAT FIX RESOL

## (undated) DEVICE — 1200 GUARD II KIT W/5MM TUBE W/O VAC TUBE: Brand: GUARDIAN

## (undated) DEVICE — SNARE ENDOSCP M L240CM W27MM SHTH DIA2.4MM CHN 2.8MM OVL

## (undated) DEVICE — BAG SPEC BIOHZD LF 2MIL 6X10IN -- CONVERT TO ITEM 357326

## (undated) DEVICE — ENDO CARRY-ON PROCEDURE KIT INCLUDES ENZYMATIC SPONGE, GAUZE, BIOHAZARD LABEL, TRAY, LUBRICANT, DIRTY SCOPE LABEL, WATER LABEL, TRAY, DRAWSTRING PAD, AND DEFENDO 4-PIECE KIT.: Brand: ENDO CARRY-ON PROCEDURE KIT

## (undated) DEVICE — BW-412T DISP COMBO CLEANING BRUSH: Brand: SINGLE USE COMBINATION CLEANING BRUSH